# Patient Record
Sex: FEMALE | Race: BLACK OR AFRICAN AMERICAN | NOT HISPANIC OR LATINO
[De-identification: names, ages, dates, MRNs, and addresses within clinical notes are randomized per-mention and may not be internally consistent; named-entity substitution may affect disease eponyms.]

---

## 2017-10-06 ENCOUNTER — RECORD ABSTRACTING (OUTPATIENT)
Age: 67
End: 2017-10-06

## 2017-10-09 ENCOUNTER — RECORD ABSTRACTING (OUTPATIENT)
Age: 67
End: 2017-10-09

## 2017-10-09 DIAGNOSIS — Z82.49 FAMILY HISTORY OF ISCHEMIC HEART DISEASE AND OTHER DISEASES OF THE CIRCULATORY SYSTEM: ICD-10-CM

## 2017-10-09 DIAGNOSIS — Z83.3 FAMILY HISTORY OF DIABETES MELLITUS: ICD-10-CM

## 2017-10-12 ENCOUNTER — APPOINTMENT (OUTPATIENT)
Dept: CARDIOLOGY | Facility: CLINIC | Age: 67
End: 2017-10-12

## 2017-10-12 VITALS — SYSTOLIC BLOOD PRESSURE: 150 MMHG | DIASTOLIC BLOOD PRESSURE: 90 MMHG | HEART RATE: 78 BPM | RESPIRATION RATE: 18 BRPM

## 2017-10-12 VITALS — WEIGHT: 149 LBS | BODY MASS INDEX: 27.42 KG/M2 | HEIGHT: 62 IN

## 2017-12-08 ENCOUNTER — APPOINTMENT (OUTPATIENT)
Dept: CARDIOLOGY | Facility: CLINIC | Age: 67
End: 2017-12-08

## 2018-01-24 ENCOUNTER — APPOINTMENT (OUTPATIENT)
Dept: CARDIOLOGY | Facility: CLINIC | Age: 68
End: 2018-01-24

## 2018-01-24 VITALS — HEART RATE: 68 BPM | DIASTOLIC BLOOD PRESSURE: 80 MMHG | RESPIRATION RATE: 18 BRPM | SYSTOLIC BLOOD PRESSURE: 126 MMHG

## 2018-01-24 VITALS — BODY MASS INDEX: 27.6 KG/M2 | WEIGHT: 150 LBS | HEIGHT: 62 IN

## 2018-07-10 ENCOUNTER — APPOINTMENT (OUTPATIENT)
Dept: CARDIOLOGY | Facility: CLINIC | Age: 68
End: 2018-07-10

## 2018-07-10 VITALS — BODY MASS INDEX: 27.42 KG/M2 | WEIGHT: 149 LBS | HEIGHT: 62 IN

## 2018-07-10 VITALS — HEART RATE: 72 BPM | RESPIRATION RATE: 18 BRPM | DIASTOLIC BLOOD PRESSURE: 70 MMHG | SYSTOLIC BLOOD PRESSURE: 110 MMHG

## 2018-08-22 ENCOUNTER — APPOINTMENT (OUTPATIENT)
Dept: NEUROSURGERY | Facility: CLINIC | Age: 68
End: 2018-08-22
Payer: MEDICARE

## 2018-08-22 PROCEDURE — 99205 OFFICE O/P NEW HI 60 MIN: CPT

## 2018-09-04 ENCOUNTER — RX RENEWAL (OUTPATIENT)
Age: 68
End: 2018-09-04

## 2018-12-17 ENCOUNTER — APPOINTMENT (OUTPATIENT)
Dept: CARDIOLOGY | Facility: CLINIC | Age: 68
End: 2018-12-17

## 2019-01-09 ENCOUNTER — APPOINTMENT (OUTPATIENT)
Dept: CARDIOLOGY | Facility: CLINIC | Age: 69
End: 2019-01-09

## 2019-01-09 VITALS — BODY MASS INDEX: 27.6 KG/M2 | WEIGHT: 150 LBS | HEIGHT: 62 IN

## 2019-01-09 VITALS — SYSTOLIC BLOOD PRESSURE: 126 MMHG | DIASTOLIC BLOOD PRESSURE: 80 MMHG

## 2019-01-09 VITALS — RESPIRATION RATE: 18 BRPM | HEART RATE: 64 BPM

## 2019-01-09 DIAGNOSIS — R07.9 CHEST PAIN, UNSPECIFIED: ICD-10-CM

## 2019-01-09 NOTE — PHYSICAL EXAM
[General Appearance - Well Developed] : well developed [Normal Appearance] : normal appearance [Well Groomed] : well groomed [General Appearance - Well Nourished] : well nourished [No Deformities] : no deformities [General Appearance - In No Acute Distress] : no acute distress [Normal Conjunctiva] : the conjunctiva exhibited no abnormalities [Eyelids - No Xanthelasma] : the eyelids demonstrated no xanthelasmas [Normal Oral Mucosa] : normal oral mucosa [No Oral Pallor] : no oral pallor [No Oral Cyanosis] : no oral cyanosis [Normal Jugular Venous A Waves Present] : normal jugular venous A waves present [Normal Jugular Venous V Waves Present] : normal jugular venous V waves present [No Jugular Venous Grace A Waves] : no jugular venous grace A waves [Respiration, Rhythm And Depth] : normal respiratory rhythm and effort [Exaggerated Use Of Accessory Muscles For Inspiration] : no accessory muscle use [Auscultation Breath Sounds / Voice Sounds] : lungs were clear to auscultation bilaterally [Heart Rate And Rhythm] : heart rate and rhythm were normal [Heart Sounds] : normal S1 and S2 [Murmurs] : no murmurs present [Bowel Sounds] : normal bowel sounds [Abdomen Soft] : soft [Abdomen Tenderness] : non-tender [Abdomen Mass (___ Cm)] : no abdominal mass palpated [Abnormal Walk] : normal gait [Gait - Sufficient For Exercise Testing] : the gait was sufficient for exercise testing [Nail Clubbing] : no clubbing of the fingernails [Cyanosis, Localized] : no localized cyanosis [Petechial Hemorrhages (___cm)] : no petechial hemorrhages [Skin Color & Pigmentation] : normal skin color and pigmentation [] : no rash [No Venous Stasis] : no venous stasis [Skin Lesions] : no skin lesions [No Skin Ulcers] : no skin ulcer [No Xanthoma] : no  xanthoma was observed [Oriented To Time, Place, And Person] : oriented to person, place, and time

## 2019-02-12 ENCOUNTER — APPOINTMENT (OUTPATIENT)
Dept: CARDIOLOGY | Facility: CLINIC | Age: 69
End: 2019-02-12

## 2019-07-17 ENCOUNTER — APPOINTMENT (OUTPATIENT)
Dept: CARDIOLOGY | Facility: CLINIC | Age: 69
End: 2019-07-17
Payer: MEDICARE

## 2019-07-17 VITALS — DIASTOLIC BLOOD PRESSURE: 70 MMHG | RESPIRATION RATE: 18 BRPM | HEART RATE: 76 BPM | SYSTOLIC BLOOD PRESSURE: 126 MMHG

## 2019-07-17 PROCEDURE — 93000 ELECTROCARDIOGRAM COMPLETE: CPT

## 2019-07-17 PROCEDURE — 99214 OFFICE O/P EST MOD 30 MIN: CPT

## 2019-07-17 NOTE — PHYSICAL EXAM
[General Appearance - Well Developed] : well developed [Normal Appearance] : normal appearance [Well Groomed] : well groomed [General Appearance - Well Nourished] : well nourished [No Deformities] : no deformities [General Appearance - In No Acute Distress] : no acute distress [Normal Conjunctiva] : the conjunctiva exhibited no abnormalities [Eyelids - No Xanthelasma] : the eyelids demonstrated no xanthelasmas [Normal Oral Mucosa] : normal oral mucosa [No Oral Pallor] : no oral pallor [No Oral Cyanosis] : no oral cyanosis [Normal Jugular Venous A Waves Present] : normal jugular venous A waves present [Normal Jugular Venous V Waves Present] : normal jugular venous V waves present [No Jugular Venous Grace A Waves] : no jugular venous grace A waves [Heart Rate And Rhythm] : heart rate and rhythm were normal [Heart Sounds] : normal S1 and S2 [Murmurs] : no murmurs present [Respiration, Rhythm And Depth] : normal respiratory rhythm and effort [Exaggerated Use Of Accessory Muscles For Inspiration] : no accessory muscle use [Auscultation Breath Sounds / Voice Sounds] : lungs were clear to auscultation bilaterally [Bowel Sounds] : normal bowel sounds [Abdomen Soft] : soft [Abdomen Tenderness] : non-tender [Abdomen Mass (___ Cm)] : no abdominal mass palpated [Abnormal Walk] : normal gait [Gait - Sufficient For Exercise Testing] : the gait was sufficient for exercise testing [Nail Clubbing] : no clubbing of the fingernails [Cyanosis, Localized] : no localized cyanosis [Petechial Hemorrhages (___cm)] : no petechial hemorrhages [Skin Color & Pigmentation] : normal skin color and pigmentation [] : no rash [No Venous Stasis] : no venous stasis [Skin Lesions] : no skin lesions [No Skin Ulcers] : no skin ulcer [No Xanthoma] : no  xanthoma was observed [Oriented To Time, Place, And Person] : oriented to person, place, and time

## 2020-01-15 ENCOUNTER — APPOINTMENT (OUTPATIENT)
Dept: CARDIOLOGY | Facility: CLINIC | Age: 70
End: 2020-01-15
Payer: MEDICARE

## 2020-01-15 VITALS — HEART RATE: 84 BPM | DIASTOLIC BLOOD PRESSURE: 80 MMHG | SYSTOLIC BLOOD PRESSURE: 130 MMHG | RESPIRATION RATE: 18 BRPM

## 2020-01-15 VITALS — BODY MASS INDEX: 27.6 KG/M2 | WEIGHT: 150 LBS | HEIGHT: 62 IN

## 2020-01-15 PROCEDURE — 93000 ELECTROCARDIOGRAM COMPLETE: CPT

## 2020-01-15 PROCEDURE — 99214 OFFICE O/P EST MOD 30 MIN: CPT

## 2020-01-15 NOTE — PHYSICAL EXAM
[General Appearance - Well Developed] : well developed [Normal Appearance] : normal appearance [Well Groomed] : well groomed [No Deformities] : no deformities [General Appearance - Well Nourished] : well nourished [General Appearance - In No Acute Distress] : no acute distress [Normal Oral Mucosa] : normal oral mucosa [Normal Conjunctiva] : the conjunctiva exhibited no abnormalities [Eyelids - No Xanthelasma] : the eyelids demonstrated no xanthelasmas [No Oral Pallor] : no oral pallor [Normal Jugular Venous A Waves Present] : normal jugular venous A waves present [No Oral Cyanosis] : no oral cyanosis [Normal Jugular Venous V Waves Present] : normal jugular venous V waves present [No Jugular Venous Grace A Waves] : no jugular venous grace A waves [Heart Rate And Rhythm] : heart rate and rhythm were normal [Heart Sounds] : normal S1 and S2 [Murmurs] : no murmurs present [Respiration, Rhythm And Depth] : normal respiratory rhythm and effort [Auscultation Breath Sounds / Voice Sounds] : lungs were clear to auscultation bilaterally [Exaggerated Use Of Accessory Muscles For Inspiration] : no accessory muscle use [Bowel Sounds] : normal bowel sounds [Abdomen Soft] : soft [Abdomen Tenderness] : non-tender [Abdomen Mass (___ Cm)] : no abdominal mass palpated [Gait - Sufficient For Exercise Testing] : the gait was sufficient for exercise testing [Abnormal Walk] : normal gait [Cyanosis, Localized] : no localized cyanosis [Nail Clubbing] : no clubbing of the fingernails [Petechial Hemorrhages (___cm)] : no petechial hemorrhages [] : no rash [Skin Color & Pigmentation] : normal skin color and pigmentation [No Venous Stasis] : no venous stasis [Skin Lesions] : no skin lesions [No Skin Ulcers] : no skin ulcer [No Xanthoma] : no  xanthoma was observed [Oriented To Time, Place, And Person] : oriented to person, place, and time

## 2020-04-29 ENCOUNTER — OUTPATIENT (OUTPATIENT)
Dept: OUTPATIENT SERVICES | Facility: HOSPITAL | Age: 70
LOS: 1 days | Discharge: HOME | End: 2020-04-29

## 2020-07-17 ENCOUNTER — APPOINTMENT (OUTPATIENT)
Dept: CARDIOLOGY | Facility: CLINIC | Age: 70
End: 2020-07-17

## 2020-08-12 ENCOUNTER — APPOINTMENT (OUTPATIENT)
Dept: CARDIOLOGY | Facility: CLINIC | Age: 70
End: 2020-08-12
Payer: MEDICARE

## 2020-08-12 VITALS — SYSTOLIC BLOOD PRESSURE: 120 MMHG | RESPIRATION RATE: 18 BRPM | HEART RATE: 76 BPM | DIASTOLIC BLOOD PRESSURE: 80 MMHG

## 2020-08-12 VITALS — BODY MASS INDEX: 26.31 KG/M2 | TEMPERATURE: 96.1 F | HEIGHT: 62 IN | WEIGHT: 143 LBS

## 2020-08-12 PROCEDURE — 93000 ELECTROCARDIOGRAM COMPLETE: CPT

## 2020-08-12 PROCEDURE — 99214 OFFICE O/P EST MOD 30 MIN: CPT

## 2020-08-12 NOTE — PHYSICAL EXAM
[General Appearance - Well Developed] : well developed [Normal Appearance] : normal appearance [Well Groomed] : well groomed [General Appearance - Well Nourished] : well nourished [General Appearance - In No Acute Distress] : no acute distress [No Deformities] : no deformities [Normal Conjunctiva] : the conjunctiva exhibited no abnormalities [Eyelids - No Xanthelasma] : the eyelids demonstrated no xanthelasmas [Normal Oral Mucosa] : normal oral mucosa [No Oral Pallor] : no oral pallor [No Oral Cyanosis] : no oral cyanosis [Normal Jugular Venous V Waves Present] : normal jugular venous V waves present [Normal Jugular Venous A Waves Present] : normal jugular venous A waves present [Heart Rate And Rhythm] : heart rate and rhythm were normal [No Jugular Venous Grace A Waves] : no jugular venous grace A waves [Murmurs] : no murmurs present [Heart Sounds] : normal S1 and S2 [Respiration, Rhythm And Depth] : normal respiratory rhythm and effort [Exaggerated Use Of Accessory Muscles For Inspiration] : no accessory muscle use [Auscultation Breath Sounds / Voice Sounds] : lungs were clear to auscultation bilaterally [Bowel Sounds] : normal bowel sounds [Abdomen Soft] : soft [Abdomen Tenderness] : non-tender [Abdomen Mass (___ Cm)] : no abdominal mass palpated [Abnormal Walk] : normal gait [Gait - Sufficient For Exercise Testing] : the gait was sufficient for exercise testing [Nail Clubbing] : no clubbing of the fingernails [Cyanosis, Localized] : no localized cyanosis [Petechial Hemorrhages (___cm)] : no petechial hemorrhages [Skin Color & Pigmentation] : normal skin color and pigmentation [] : no rash [No Venous Stasis] : no venous stasis [Skin Lesions] : no skin lesions [No Skin Ulcers] : no skin ulcer [No Xanthoma] : no  xanthoma was observed [Oriented To Time, Place, And Person] : oriented to person, place, and time

## 2020-08-21 ENCOUNTER — APPOINTMENT (OUTPATIENT)
Dept: OTOLARYNGOLOGY | Facility: CLINIC | Age: 70
End: 2020-08-21
Payer: MEDICARE

## 2020-08-21 VITALS — BODY MASS INDEX: 26.5 KG/M2 | WEIGHT: 144 LBS | HEIGHT: 62 IN

## 2020-08-21 DIAGNOSIS — R13.10 DYSPHAGIA, UNSPECIFIED: ICD-10-CM

## 2020-08-21 PROCEDURE — 99204 OFFICE O/P NEW MOD 45 MIN: CPT

## 2020-08-21 NOTE — ASSESSMENT
[FreeTextEntry1] : Patient could not tolerate endoscopic exam today.\par She will go for a CT scan instead and a swallow study. \par \par i explained to the patient that we are sending her for the tests since she was unable to get the laryngoscopy today. \par She was thankful at the end of the consultation and will follow up after results. \par \par All questions were answered.

## 2020-08-21 NOTE — HISTORY OF PRESENT ILLNESS
[de-identified] : Patient presents today with c/o dysphagia. Patient admits it has been going on for a few months but recently worsened over the past month. it started all of a sudden, no recent infections. Patient states dysphagia with solid and warm foods. No issues with swallowing liquids. Occasional choking. \par Occasional reflux and PND.\par No dysphonia. No pain. \par \par Never smoked cigarettes.

## 2020-11-06 ENCOUNTER — APPOINTMENT (OUTPATIENT)
Dept: CARDIOLOGY | Facility: CLINIC | Age: 70
End: 2020-11-06
Payer: MEDICARE

## 2020-11-06 PROCEDURE — 99072 ADDL SUPL MATRL&STAF TM PHE: CPT

## 2020-11-06 PROCEDURE — 93306 TTE W/DOPPLER COMPLETE: CPT

## 2021-02-22 ENCOUNTER — APPOINTMENT (OUTPATIENT)
Dept: CARDIOLOGY | Facility: CLINIC | Age: 71
End: 2021-02-22
Payer: MEDICARE

## 2021-02-22 VITALS — SYSTOLIC BLOOD PRESSURE: 120 MMHG | HEART RATE: 72 BPM | RESPIRATION RATE: 18 BRPM | DIASTOLIC BLOOD PRESSURE: 70 MMHG

## 2021-02-22 VITALS — TEMPERATURE: 97.2 F | BODY MASS INDEX: 26.87 KG/M2 | WEIGHT: 146 LBS | HEIGHT: 62 IN

## 2021-02-22 PROCEDURE — 99214 OFFICE O/P EST MOD 30 MIN: CPT

## 2021-02-22 PROCEDURE — 93000 ELECTROCARDIOGRAM COMPLETE: CPT

## 2021-02-22 PROCEDURE — 99072 ADDL SUPL MATRL&STAF TM PHE: CPT

## 2021-03-16 LAB
ALBUMIN SERPL ELPH-MCNC: 4.1 G/DL
ALP BLD-CCNC: 102 U/L
ALT SERPL-CCNC: 13 U/L
ANION GAP SERPL CALC-SCNC: 10 MMOL/L
AST SERPL-CCNC: 21 U/L
BILIRUB SERPL-MCNC: 0.3 MG/DL
BUN SERPL-MCNC: 12 MG/DL
CALCIUM SERPL-MCNC: 9.2 MG/DL
CHLORIDE SERPL-SCNC: 103 MMOL/L
CHOLEST SERPL-MCNC: 211 MG/DL
CO2 SERPL-SCNC: 24 MMOL/L
CREAT SERPL-MCNC: 0.9 MG/DL
ESTIMATED AVERAGE GLUCOSE: 186 MG/DL
GLUCOSE SERPL-MCNC: 160 MG/DL
HBA1C MFR BLD HPLC: 8.1 %
HDLC SERPL-MCNC: 74 MG/DL
LDLC SERPL CALC-MCNC: 137 MG/DL
NONHDLC SERPL-MCNC: 137 MG/DL
POTASSIUM SERPL-SCNC: 4.3 MMOL/L
PROT SERPL-MCNC: 9 G/DL
SODIUM SERPL-SCNC: 137 MMOL/L
TRIGL SERPL-MCNC: 80 MG/DL

## 2021-08-20 LAB
ALBUMIN SERPL ELPH-MCNC: 4.2 G/DL
ALP BLD-CCNC: 101 U/L
ALT SERPL-CCNC: 18 U/L
ANION GAP SERPL CALC-SCNC: 10 MMOL/L
AST SERPL-CCNC: 29 U/L
BASOPHILS # BLD AUTO: 0.08 K/UL
BASOPHILS NFR BLD AUTO: 1.3 %
BILIRUB SERPL-MCNC: 0.4 MG/DL
BUN SERPL-MCNC: 11 MG/DL
CALCIUM SERPL-MCNC: 9.5 MG/DL
CHLORIDE SERPL-SCNC: 101 MMOL/L
CHOLEST SERPL-MCNC: 202 MG/DL
CO2 SERPL-SCNC: 26 MMOL/L
CREAT SERPL-MCNC: 1 MG/DL
EOSINOPHIL # BLD AUTO: 0.2 K/UL
EOSINOPHIL NFR BLD AUTO: 3.2 %
ESTIMATED AVERAGE GLUCOSE: 148 MG/DL
GLUCOSE SERPL-MCNC: 101 MG/DL
HBA1C MFR BLD HPLC: 6.8 %
HCT VFR BLD CALC: 36.6 %
HDLC SERPL-MCNC: 74 MG/DL
HGB BLD-MCNC: 12 G/DL
IMM GRANULOCYTES NFR BLD AUTO: 0.3 %
LDLC SERPL CALC-MCNC: 121 MG/DL
LYMPHOCYTES # BLD AUTO: 1.91 K/UL
LYMPHOCYTES NFR BLD AUTO: 30.1 %
MAN DIFF?: NORMAL
MCHC RBC-ENTMCNC: 27.5 PG
MCHC RBC-ENTMCNC: 32.8 G/DL
MCV RBC AUTO: 83.9 FL
MONOCYTES # BLD AUTO: 0.49 K/UL
MONOCYTES NFR BLD AUTO: 7.7 %
NEUTROPHILS # BLD AUTO: 3.64 K/UL
NEUTROPHILS NFR BLD AUTO: 57.4 %
NONHDLC SERPL-MCNC: 128 MG/DL
PLATELET # BLD AUTO: 224 K/UL
POTASSIUM SERPL-SCNC: 4.6 MMOL/L
PROT SERPL-MCNC: 9.2 G/DL
RBC # BLD: 4.36 M/UL
RBC # FLD: 14 %
SODIUM SERPL-SCNC: 137 MMOL/L
TRIGL SERPL-MCNC: 79 MG/DL
WBC # FLD AUTO: 6.34 K/UL

## 2021-08-25 ENCOUNTER — APPOINTMENT (OUTPATIENT)
Dept: CARDIOLOGY | Facility: CLINIC | Age: 71
End: 2021-08-25
Payer: MEDICARE

## 2021-08-25 VITALS — HEIGHT: 62 IN | WEIGHT: 145 LBS | BODY MASS INDEX: 26.68 KG/M2

## 2021-08-25 VITALS — SYSTOLIC BLOOD PRESSURE: 130 MMHG | DIASTOLIC BLOOD PRESSURE: 80 MMHG | RESPIRATION RATE: 18 BRPM | HEART RATE: 60 BPM

## 2021-08-25 PROCEDURE — 93000 ELECTROCARDIOGRAM COMPLETE: CPT

## 2021-08-25 PROCEDURE — 99214 OFFICE O/P EST MOD 30 MIN: CPT

## 2021-08-25 NOTE — PHYSICAL EXAM
[General Appearance - Well Developed] : well developed [Normal Appearance] : normal appearance [Well Groomed] : well groomed [General Appearance - Well Nourished] : well nourished [No Deformities] : no deformities [General Appearance - In No Acute Distress] : no acute distress [Normal Conjunctiva] : the conjunctiva exhibited no abnormalities [Eyelids - No Xanthelasma] : the eyelids demonstrated no xanthelasmas [Normal Oral Mucosa] : normal oral mucosa [No Oral Pallor] : no oral pallor [No Oral Cyanosis] : no oral cyanosis [Normal Jugular Venous A Waves Present] : normal jugular venous A waves present [Normal Jugular Venous V Waves Present] : normal jugular venous V waves present [No Jugular Venous Grace A Waves] : no jugular venous grace A waves [Heart Rate And Rhythm] : heart rate and rhythm were normal [Heart Sounds] : normal S1 and S2 [Murmurs] : no murmurs present [Respiration, Rhythm And Depth] : normal respiratory rhythm and effort [Exaggerated Use Of Accessory Muscles For Inspiration] : no accessory muscle use [Auscultation Breath Sounds / Voice Sounds] : lungs were clear to auscultation bilaterally [Bowel Sounds] : normal bowel sounds [Abdomen Soft] : soft [Abdomen Tenderness] : non-tender [Abdomen Mass (___ Cm)] : no abdominal mass palpated [Abnormal Walk] : normal gait [Gait - Sufficient For Exercise Testing] : the gait was sufficient for exercise testing [Nail Clubbing] : no clubbing of the fingernails [Cyanosis, Localized] : no localized cyanosis [Petechial Hemorrhages (___cm)] : no petechial hemorrhages [] : no rash [Skin Color & Pigmentation] : normal skin color and pigmentation [No Venous Stasis] : no venous stasis [Skin Lesions] : no skin lesions [No Skin Ulcers] : no skin ulcer [No Xanthoma] : no  xanthoma was observed [Oriented To Time, Place, And Person] : oriented to person, place, and time

## 2021-09-15 ENCOUNTER — APPOINTMENT (OUTPATIENT)
Dept: VASCULAR SURGERY | Facility: CLINIC | Age: 71
End: 2021-09-15
Payer: MEDICARE

## 2021-09-15 VITALS
DIASTOLIC BLOOD PRESSURE: 81 MMHG | HEART RATE: 59 BPM | TEMPERATURE: 94.5 F | WEIGHT: 147 LBS | SYSTOLIC BLOOD PRESSURE: 180 MMHG | HEIGHT: 62 IN | BODY MASS INDEX: 27.05 KG/M2

## 2021-09-15 DIAGNOSIS — I70.213 ATHEROSCLEROSIS OF NATIVE ARTERIES OF EXTREMITIES WITH INTERMITTENT CLAUDICATION, BILATERAL LEGS: ICD-10-CM

## 2021-09-15 DIAGNOSIS — I73.9 PERIPHERAL VASCULAR DISEASE, UNSPECIFIED: ICD-10-CM

## 2021-09-15 PROCEDURE — 99204 OFFICE O/P NEW MOD 45 MIN: CPT

## 2021-09-15 PROCEDURE — 93925 LOWER EXTREMITY STUDY: CPT

## 2021-09-15 NOTE — ASSESSMENT
[FreeTextEntry1] : Patient is a 71-year-old female with diabetes who presents with lower extremity numbness. Patient has a palpable dorsalis pedis pulse on physical exam. 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 She denies specific claudication-type symptoms or rest pain. Patient has no evidence of tissue loss. From my standpoint I would like see the patient back in my office in 6 month time for a followup evaluation. Neurovascular surgery intervention at this time. I recommend she followup with neurology for her lower back evaluation.

## 2021-09-15 NOTE — PHYSICAL EXAM
[0] : left 0 [2+] : left 2+ [Ankle Swelling On The Right] : mild [] : of the right leg [Alert] : alert [Oriented to Person] : oriented to person [Oriented to Place] : oriented to place [Oriented to Time] : oriented to time [Calm] : calm [Ankle Swelling (On Exam)] : not present [Varicose Veins Of Lower Extremities] : not present [Abdomen Masses] : No abdominal masses [Abdomen Tenderness] : ~T ~M No abdominal tenderness [Abdominal Bruit] : no abdominal bruit  [Tender] : was nontender [Enlarged] : not enlarged [Purpura] : no purpura  [Petechiae] : no petechiae [Skin Ulcer] : no ulcer [Skin Induration] : no induration

## 2021-09-15 NOTE — HISTORY OF PRESENT ILLNESS
[FreeTextEntry1] : Patient is a 71-year-old female who presents complaining of numbness which is intermittent her feet. The patient states she wakes up from her sleep with numbness to her feet. The patient is diabetic. Chest complains of back pain which is intermittent. Patient states her ambulation has gotten slower and she fatigues  easier

## 2021-10-04 ENCOUNTER — APPOINTMENT (OUTPATIENT)
Dept: VASCULAR SURGERY | Facility: CLINIC | Age: 71
End: 2021-10-04

## 2021-10-13 ENCOUNTER — APPOINTMENT (OUTPATIENT)
Dept: OBGYN | Facility: CLINIC | Age: 71
End: 2021-10-13

## 2022-01-13 ENCOUNTER — APPOINTMENT (OUTPATIENT)
Dept: OBGYN | Facility: CLINIC | Age: 72
End: 2022-01-13
Payer: MEDICARE

## 2022-01-13 VITALS
HEART RATE: 79 BPM | BODY MASS INDEX: 26.31 KG/M2 | TEMPERATURE: 97.7 F | DIASTOLIC BLOOD PRESSURE: 80 MMHG | SYSTOLIC BLOOD PRESSURE: 132 MMHG | WEIGHT: 143 LBS | HEIGHT: 62 IN

## 2022-01-13 DIAGNOSIS — Z78.9 OTHER SPECIFIED HEALTH STATUS: ICD-10-CM

## 2022-01-13 LAB
BILIRUB UR QL STRIP: NORMAL
CLARITY UR: CLEAR
COLLECTION METHOD: NORMAL
GLUCOSE UR-MCNC: NORMAL
HCG UR QL: 0.2 EU/DL
HGB UR QL STRIP.AUTO: NORMAL
KETONES UR-MCNC: NORMAL
LEUKOCYTE ESTERASE UR QL STRIP: ABNORMAL
NITRITE UR QL STRIP: NORMAL
PH UR STRIP: 5
PROT UR STRIP-MCNC: ABNORMAL
SP GR UR STRIP: 1.02

## 2022-01-13 PROCEDURE — 99387 INIT PM E/M NEW PAT 65+ YRS: CPT

## 2022-01-13 PROCEDURE — 81003 URINALYSIS AUTO W/O SCOPE: CPT | Mod: QW

## 2022-01-13 PROCEDURE — G0101: CPT

## 2022-01-13 NOTE — REVIEW OF SYSTEMS
[Urgency] : urgency [Pelvic pain] : pelvic pain [Skin Rash] : skin rash [Negative] : Heme/Lymph [de-identified] : vuvlar discomfort ,

## 2022-01-13 NOTE — PHYSICAL EXAM
[Appropriately responsive] : appropriately responsive [Alert] : alert [No Acute Distress] : no acute distress [No Lymphadenopathy] : no lymphadenopathy [Regular Rate Rhythm] : regular rate rhythm [No Murmurs] : no murmurs [Clear to Auscultation B/L] : clear to auscultation bilaterally [Soft] : soft [Non-distended] : non-distended [No HSM] : No HSM [No Lesions] : no lesions [No Mass] : no mass [Oriented x3] : oriented x3 [FreeTextEntry6] : wnl [FreeTextEntry7] : mildly tender  [Examination Of The Breasts] : a normal appearance [Normal] : normal [No Discharge] : no discharge [No Masses] : no breast masses were palpable [FreeTextEntry1] : b/l labial b/l dermatitis , erythema

## 2022-01-13 NOTE — HISTORY OF PRESENT ILLNESS
[N] : Patient denies prior pregnancies [Patient reported mammogram was normal] : Patient reported mammogram was normal [Patient reported PAP Smear was normal] : Patient reported PAP Smear was normal [Hot Flashes] : hot flashes [Menarche Age: ____] : age at menarche was [unfilled] [Menopause Age: ____] : age at menopause was [unfilled] [FreeTextEntry1] : 72 yo P-0 LMP- menopause age 38   is here for initial visit , annual exam , denies vaginal bleeding , Patient , complain of pelvic pain both sides on and off for 2 years. dull, nothing makes it better or worse. She states that over past year It takes a while  for he to void , no pain with urination. She denies n/v/d constipation or bloating.  [TextBox_4] : GYNHX\par No history of fibroids, cysts, or STDs\par  [Mammogramdate] : 2019 [PapSmeardate] : 2014 [BoneDensityDate] : 2016 [ColonoscopyDate] : 2010 [PGHxTotal] : 0

## 2022-01-13 NOTE — DISCUSSION/SUMMARY
[FreeTextEntry1] : 72 yo p0 for annual exam . pelvic pain , vulvar erythema/eczema\par difficulty urinating \par mamogram\par dexa\par urogyn referal\par hydrocortisone 2.5 % bid for a week \par pelvic sono to schedule\par Gi f/up advised for colonoscopy

## 2022-02-01 ENCOUNTER — LABORATORY RESULT (OUTPATIENT)
Age: 72
End: 2022-02-01

## 2022-02-01 ENCOUNTER — APPOINTMENT (OUTPATIENT)
Dept: NEUROLOGY | Facility: CLINIC | Age: 72
End: 2022-02-01
Payer: MEDICARE

## 2022-02-01 VITALS
HEART RATE: 81 BPM | OXYGEN SATURATION: 97 % | HEIGHT: 63 IN | SYSTOLIC BLOOD PRESSURE: 158 MMHG | DIASTOLIC BLOOD PRESSURE: 75 MMHG | TEMPERATURE: 97 F | WEIGHT: 146 LBS | BODY MASS INDEX: 25.87 KG/M2

## 2022-02-01 DIAGNOSIS — R41.3 OTHER AMNESIA: ICD-10-CM

## 2022-02-01 PROCEDURE — 99204 OFFICE O/P NEW MOD 45 MIN: CPT

## 2022-02-01 RX ORDER — METFORMIN ER 750 MG 750 MG/1
750 TABLET ORAL DAILY
Qty: 60 | Refills: 5 | Status: ACTIVE | COMMUNITY

## 2022-02-01 RX ORDER — AMLODIPINE BESYLATE AND BENAZEPRIL HYDROCHLORIDE 10; 20 MG/1; MG/1
10-20 CAPSULE ORAL
Refills: 0 | Status: ACTIVE | COMMUNITY

## 2022-02-01 RX ORDER — GLYBURIDE 5 MG/1
5 TABLET ORAL TWICE DAILY
Qty: 180 | Refills: 3 | Status: ACTIVE | COMMUNITY

## 2022-02-02 NOTE — PHYSICAL EXAM
[FreeTextEntry1] : Recent and remote memory, general fund of knowledge, attention, concentration, and language function were intact.  Pupils are equal, round and reactive to light and accommodation.  Funduscopic exam did not show any papilledema.  Visual fields are intact to confrontation.  Extraocular movements are full.  There is no nystagmus.  The facial muscles are symmetric.  Facial sensation is intact to light touch, temperature, and pinprick.  Hearing is intact to finger rub bilaterally.  Tongue is midline.  Palate elevates symmetrically.  Neck is supple.  There is no meningismus.  Shoulder shrugs are symmetric.  Motor exam demonstrates 5/5 strength in the proximal and distal muscles of the upper and lower extremities.  Tone and bulk were normal.  There is no pronator drift.  Reflexes are 2+ bilaterally in the biceps, triceps, brachioradialis, patellae and ankles.  Toes are downgoing.  There is no clonus.  Coordination demonstrates normal finger-to-nose, heel-to-shin and rapid alternating movements.  Gait demonstrates normal tandem, heel, and toe walk.  LT, PP, vibration intact in UE's but decrease LT, PP below calves in LE's.   \par \par The patient is well-developed, well-nourished female in no acute distress.  Cardiac exam demonstrates a regular rate and rhythm.  No murmurs.  Carotids are 2+ bilaterally.  No bruits.  Abdomen is soft, nontender, and nondistended.  Bowel sounds are present.  Extremities showed no clubbing, cyanosis or edema. \par \par \par

## 2022-02-02 NOTE — REVIEW OF SYSTEMS
[Feeling Tired] : feeling tired [Recent Weight Loss (___ Lbs)] : recent [unfilled] ~Ulb weight loss [Memory Lapses or Loss] : memory loss [Hand Weakness] :  hand weakness [Poor Coordination] : poor coordination [Numbness] : numbness [Tingling] : tingling [Vertigo] : vertigo [Tension Headache] : tension-type headaches [Anxiety] : anxiety [Eye Pain] : eye pain [Arthralgias] : arthralgias [Joint Pain] : joint pain [Muscle Weakness] : muscle weakness [Fever] : no fever [Chills] : no chills [Feeling Poorly] : not feeling poorly [Recent Weight Gain (___ Lbs)] : no recent weight gain [Confused or Disoriented] : no confusion [Decr. Concentrating Ability] : no decrease in concentrating ability [Facial Weakness] : no facial weakness [Arm Weakness] : no arm weakness [Leg Weakness] : no leg weakness [Seizures] : no convulsions [Dizziness] : no dizziness [Fainting] : no fainting [Lightheadedness] : no lightheadedness [Cluster Headache] : no cluster headache [Migraine Headache] : no migraine headache [Difficulty Walking] : no difficulty walking [Inability to Walk] : able to walk [Frequent Falls] : not falling [Suicidal] : not suicidal [Sleep Disturbances] : no sleep disturbances [Depression] : no depression [Eyesight Problems] : no eyesight problems [Earache] : no earache [Loss Of Hearing] : no hearing loss [Heart Rate Is Slow] : the heart rate was not slow [Heart Rate Is Fast] : the heart rate was not fast [Chest Pain] : no chest pain [Palpitations] : no palpitations [Shortness Of Breath] : no shortness of breath [Wheezing] : no wheezing [Cough] : no cough [Abdominal Pain] : no abdominal pain [Vomiting] : no vomiting [Constipation] : no constipation [Diarrhea] : no diarrhea [Dysuria] : no dysuria [Incontinence] : no incontinence [Joint Swelling] : no joint swelling [Joint Stiffness] : no joint stiffness [Skin Lesions] : no skin lesions [Skin Wound] : no skin wound [Hot Flashes] : no hot flashes [Easy Bleeding] : no tendency for easy bleeding [Easy Bruising] : no tendency for easy bruising [de-identified] : tingling is usually in hands or legs.  recent vertigo last weak when she woke up [FreeTextEntry9] : toes, legs [de-identified] : in hands and arms

## 2022-02-02 NOTE — ASSESSMENT
[FreeTextEntry1] : 1.  Suspect bilateral CTS.\par 2.  Numbness suspected neuropathy.  \par 3.  Given asymmetrical sensory complaints and memory loss need to exclude stroke.\par \par Plan:\par 1.  EMG/NCS bilateral UE's and LLE.\par 2.  Wear bilateral wrist splints at night.\par 3.  Labs:  B12/folate, TSH, SPEP/IF, HIV, Lyme.\par 4.  MRI brain w/o contrast.\par 5.  Aspirin 81 mg/day until MRI resulted.\par 6.  Continue to work on blood sugar control.

## 2022-02-02 NOTE — HISTORY OF PRESENT ILLNESS
[FreeTextEntry1] : Pt is 71 yof diabetic c/o sharp pains on her left side leg (squeezing in left arm). Felt pain in her face.  These symptoms have been going on for a while.  States sx's started right after she got the Vaccine ([employer]), first one April 2021, second dose in May 2021.  Can't recall whether started after first or second dose, but she thinks it was after the first dose.  States it a few weeks after the first dose.  Squeezing of muscles of left arm woke her up and made it hard to sleep.  Symptoms are not all day, starts in middle of night and wears off.  States she has chronic neck pain.  Denies neck or spine surgeries.  States problems with her balance all the time - for a while (couple of years), but more now than before (before was occasional, now every day).  States she has chronic shoulder pain  bilaterally.  (Currently doesn't feel it, but does hurt on a daily basis, has back pain (mid to lower) mostly when stressed.\par \par Never EMG/NCS.  States hasn't been going to doctor since experiencing these things.  \par \par Stats A1c was 6.9

## 2022-02-10 ENCOUNTER — APPOINTMENT (OUTPATIENT)
Dept: CARDIOLOGY | Facility: CLINIC | Age: 72
End: 2022-02-10
Payer: MEDICARE

## 2022-02-10 PROCEDURE — 93306 TTE W/DOPPLER COMPLETE: CPT

## 2022-02-11 LAB
ALBUMIN SERPL ELPH-MCNC: 4.3 G/DL
ALP BLD-CCNC: 102 U/L
ALT SERPL-CCNC: 11 U/L
ANION GAP SERPL CALC-SCNC: 11 MMOL/L
AST SERPL-CCNC: 18 U/L
BILIRUB SERPL-MCNC: 0.4 MG/DL
BUN SERPL-MCNC: 15 MG/DL
CALCIUM SERPL-MCNC: 9.5 MG/DL
CHLORIDE SERPL-SCNC: 104 MMOL/L
CHOLEST SERPL-MCNC: 199 MG/DL
CO2 SERPL-SCNC: 25 MMOL/L
CREAT SERPL-MCNC: 1 MG/DL
ESTIMATED AVERAGE GLUCOSE: 154 MG/DL
GLUCOSE SERPL-MCNC: 115 MG/DL
HBA1C MFR BLD HPLC: 7 %
HDLC SERPL-MCNC: 77 MG/DL
LDLC SERPL CALC-MCNC: 117 MG/DL
NONHDLC SERPL-MCNC: 122 MG/DL
POTASSIUM SERPL-SCNC: 4.5 MMOL/L
PROT SERPL-MCNC: 9.5 G/DL
SODIUM SERPL-SCNC: 140 MMOL/L
TRIGL SERPL-MCNC: 74 MG/DL

## 2022-02-28 ENCOUNTER — RX RENEWAL (OUTPATIENT)
Age: 72
End: 2022-02-28

## 2022-03-23 ENCOUNTER — APPOINTMENT (OUTPATIENT)
Dept: CARDIOLOGY | Facility: CLINIC | Age: 72
End: 2022-03-23
Payer: MEDICARE

## 2022-03-23 VITALS — RESPIRATION RATE: 18 BRPM | DIASTOLIC BLOOD PRESSURE: 80 MMHG | SYSTOLIC BLOOD PRESSURE: 130 MMHG | HEART RATE: 64 BPM

## 2022-03-23 VITALS — HEIGHT: 63 IN | BODY MASS INDEX: 26.22 KG/M2 | WEIGHT: 148 LBS

## 2022-03-23 PROCEDURE — 99214 OFFICE O/P EST MOD 30 MIN: CPT

## 2022-03-23 PROCEDURE — 93000 ELECTROCARDIOGRAM COMPLETE: CPT

## 2022-05-31 LAB
ALBUMIN MFR SERPL ELPH: 45.4 %
ALBUMIN SERPL-MCNC: 4.2 G/DL
ALBUMIN/GLOB SERPL: 0.8 RATIO
ALPHA1 GLOB MFR SERPL ELPH: 3.3 %
ALPHA1 GLOB SERPL ELPH-MCNC: 0.3 G/DL
ALPHA2 GLOB MFR SERPL ELPH: 7.5 %
ALPHA2 GLOB SERPL ELPH-MCNC: 0.7 G/DL
B-GLOBULIN MFR SERPL ELPH: 11.3 %
B-GLOBULIN SERPL ELPH-MCNC: 1 G/DL
DEPRECATED KAPPA LC FREE/LAMBDA SER: 2.13 RATIO
FOLATE SERPL-MCNC: >20 NG/ML
GAMMA GLOB FLD ELPH-MCNC: 3 G/DL
GAMMA GLOB MFR SERPL ELPH: 32.5 %
HIV1+2 AB SPEC QL IA.RAPID: NONREACTIVE
IGA SER QL IEP: 443 MG/DL
IGG SER QL IEP: 3294 MG/DL
IGM SER QL IEP: 75 MG/DL
INTERPRETATION SERPL IEP-IMP: NORMAL
KAPPA LC CSF-MCNC: 3.86 MG/DL
KAPPA LC SERPL-MCNC: 8.24 MG/DL
M PROTEIN SPEC IFE-MCNC: NORMAL
PROT SERPL-MCNC: 9.2 G/DL
PROT SERPL-MCNC: 9.2 G/DL
T PALLIDUM AB SER QL IA: NEGATIVE
TSH SERPL-ACNC: 3.94 UIU/ML
VIT B12 SERPL-MCNC: >2000 PG/ML

## 2022-06-09 ENCOUNTER — LABORATORY RESULT (OUTPATIENT)
Age: 72
End: 2022-06-09

## 2022-06-10 ENCOUNTER — APPOINTMENT (OUTPATIENT)
Dept: UROGYNECOLOGY | Facility: CLINIC | Age: 72
End: 2022-06-10
Payer: MEDICARE

## 2022-06-10 VITALS
BODY MASS INDEX: 26.68 KG/M2 | SYSTOLIC BLOOD PRESSURE: 131 MMHG | WEIGHT: 145 LBS | DIASTOLIC BLOOD PRESSURE: 72 MMHG | HEIGHT: 62 IN | HEART RATE: 61 BPM

## 2022-06-10 DIAGNOSIS — Z72.89 OTHER PROBLEMS RELATED TO LIFESTYLE: ICD-10-CM

## 2022-06-10 DIAGNOSIS — L29.2 PRURITUS VULVAE: ICD-10-CM

## 2022-06-10 DIAGNOSIS — Z01.419 ENCOUNTER FOR GYNECOLOGICAL EXAMINATION (GENERAL) (ROUTINE) W/OUT ABNORMAL FINDINGS: ICD-10-CM

## 2022-06-10 DIAGNOSIS — Z86.39 PERSONAL HISTORY OF OTHER ENDOCRINE, NUTRITIONAL AND METABOLIC DISEASE: ICD-10-CM

## 2022-06-10 DIAGNOSIS — R10.2 PELVIC AND PERINEAL PAIN: ICD-10-CM

## 2022-06-10 DIAGNOSIS — N39.41 URGE INCONTINENCE: ICD-10-CM

## 2022-06-10 DIAGNOSIS — Z78.9 OTHER SPECIFIED HEALTH STATUS: ICD-10-CM

## 2022-06-10 DIAGNOSIS — Z87.898 PERSONAL HISTORY OF OTHER SPECIFIED CONDITIONS: ICD-10-CM

## 2022-06-10 PROCEDURE — 51701 INSERT BLADDER CATHETER: CPT

## 2022-06-10 PROCEDURE — 99205 OFFICE O/P NEW HI 60 MIN: CPT | Mod: 25

## 2022-06-11 LAB
APPEARANCE: CLEAR
BILIRUBIN URINE: NEGATIVE
BLOOD URINE: NEGATIVE
COLOR: NORMAL
GLUCOSE QUALITATIVE U: NORMAL
KETONES URINE: NEGATIVE
LEUKOCYTE ESTERASE URINE: NEGATIVE
NITRITE URINE: NEGATIVE
PH URINE: 5.5
PROTEIN URINE: ABNORMAL
SPECIFIC GRAVITY URINE: 1.02
UROBILINOGEN URINE: NORMAL

## 2022-06-11 NOTE — COUNSELING
[FreeTextEntry1] : \par We will notify you of the urine results if they are abnormal\par \par Please increase your water intake to at least 4-5 cups of 8 ounces of plain water\par \par For 4-5 days, cut one item from the list out of your diet.\par \par After 4-5 days, it it makes a difference, you have to decide if it is worth keeping it out of your diet to help with the urinary issues.\par \par If it does not make a difference, you should add it back into your diet and remove another item for another 4-5 days.\par \par Please work on your diabetes control\par \par Please try to urinate with the leg rest to see if that helps you urinate\par \par Referral to pelvic floor physical therapy\par \par [facility]\par [address] [place] [address]\par Phone: [phone]\par \par Please call the office if you feel like you do not have enough improvement of your symptoms towards the end of your physical therapy treatment so that we can arrange the next step of management.\par \par Followup as needed

## 2022-06-11 NOTE — PHYSICAL EXAM
[Chaperone Present] : A chaperone was present in the examining room during all aspects of the physical examination [FreeTextEntry1] : Void: 75 cc (no crede)\par PVR: 55 cc\par Urethra was prepped in sterile fashion and then a sterile catheter (14F) was used by me to drain the bladder. The patient tolerated the procedure well.\par \par normal perineal sensation\par normal perineal reflexes\par negative cough stress test\par positive atrophy\par positive urethral caruncle\par no prolapse\par positive urethral hypermobility\par bilateral levator ani spasm, positive tenderness\par no urethral tenderness\par no bladder tenderness\par no cervical tenderness\par 1/5 Kegel\par

## 2022-06-11 NOTE — DISCUSSION/SUMMARY
[FreeTextEntry1] : \par Urge incontinence-\par The pathophysiology of the above condition was discussed with the patient. The patient was given information and education on pelvic floor muscle exercises/rehabilitation, avoidance of dietary bladder irritants, and other strategies to improve bladder control, such as scheduled voiding. She was counseled regarding further management strategies for overactive bladder and urge incontinence including pelvic floor physical therapy, medications or surgical management. The patient voiced understanding and agrees with diet changes and a referral to PT. We will follow up on her urine tests.\par \par

## 2022-06-11 NOTE — HISTORY OF PRESENT ILLNESS
[FreeTextEntry1] : \par Pt with pelvic floor dysfunction here for urogynecologic evaluation. She describes: \par \par Chief PFD:  urinary hesitancy\par \par Pelvic organ prolapse: no bulge, denies splinting, intermittent crede for urination (only a drop more comes out)\par Stress urinary incontinence: denies\par Overactive bladder syndrome: +urgency, frequency, no eneuresis, urge incontinence a couple of times per week, for a year, no prior treatment, no glaucoma, hx of DM (AIC 7.3)\par Voiding dysfunction:  yes Incomplete bladder emptying, yes hesitancy \par Lower urinary tract/vaginal symptoms: no recurrent UTIs per year, no hematuria, no dysuria, no bladder pain \par Fecal incontinence: denies\par Defecatory dysfunction: sausage\par Sexual dysfunction: not active\par Pelvic pain: suprapubic, intermittent, non radiating, for the last year, 4/10, worse when she needs to urinate, improves when she presses on that area, dull, does not have it now\par Vaginal dryness: denies\par \par Her pelvic floor symptoms are significantly bothersome and negatively impacting her quality of life. \par \par

## 2022-06-14 LAB — BACTERIA UR CULT: NORMAL

## 2022-08-03 ENCOUNTER — APPOINTMENT (OUTPATIENT)
Dept: NEUROLOGY | Facility: CLINIC | Age: 72
End: 2022-08-03

## 2022-08-03 DIAGNOSIS — H91.92 UNSPECIFIED HEARING LOSS, LEFT EAR: ICD-10-CM

## 2022-08-03 PROCEDURE — 95886 MUSC TEST DONE W/N TEST COMP: CPT

## 2022-08-03 PROCEDURE — 95913 NRV CNDJ TEST 13/> STUDIES: CPT

## 2022-12-21 ENCOUNTER — APPOINTMENT (OUTPATIENT)
Dept: NEUROLOGY | Facility: CLINIC | Age: 72
End: 2022-12-21

## 2022-12-21 VITALS
HEIGHT: 62 IN | SYSTOLIC BLOOD PRESSURE: 137 MMHG | DIASTOLIC BLOOD PRESSURE: 85 MMHG | BODY MASS INDEX: 24.48 KG/M2 | HEART RATE: 88 BPM | WEIGHT: 133 LBS

## 2022-12-21 PROCEDURE — 99204 OFFICE O/P NEW MOD 45 MIN: CPT

## 2022-12-21 NOTE — ASSESSMENT
[FreeTextEntry1] : Unsteady gait- as part of post concussive syndrome as well as possibly secondary to peripheral neuropathy.  Would like to obtain all the workup that has been done at the hospital for review and I would like to send her for EMG nerve conduction study of the lower extremities for further evaluation.    I recommend a trial of physical therapy for fall prevention and gait training as well as acupuncture if available\par \par Cervical radiculopathy- I would like to send her for MRI of the cervical spine without gadolinium  as well as EMG nerve conduction study of the upper extremities for further evaluation.\par \par  syncope-  without the results from the hospital,  it is unclear the exact cause of the syncope and whether it is related to either carotid or vertebral artery stenosis as patient reported.  She should continue to follow-up with medical doctor for this.

## 2022-12-21 NOTE — HISTORY OF PRESENT ILLNESS
[FreeTextEntry1] : It's a pleasure to see Ms. GEO CARMICHAEL In the office today. She is a 72 year -  old woman  who presents to the office today for the evaluation of  unsteady gait after head injury secondary to an episode of syncope 2 months ago.  She reports that she passed out at a bus stop and hit her head.  She was taken to the hospital and have scan of the brain which did not show any bleeding.  Supposedly there was this so stenosis that was attributed to the cause of the syncope, but the result of the workup isn't available to us at this time.  She did have a 2nd syncopal episode 2 weeks later and she had talked to her medical doctor who is also in the process of getting information from [facility]  where she had a workup.  There was no mentioning of any intervention for the stenosis is at this time.  She reports that in addition to feeling unsteady, she has been having numbness in her feet which never had before although she has been diabetic for long time.  In addition she also reports feeling weak in her hands since the accident   And she has been having neck pain with pain going down her arms.\par \par   She walks with a cane\par

## 2022-12-21 NOTE — PHYSICAL EXAM
[Person] : oriented to person [Place] : oriented to place [Time] : oriented to time [Concentration Intact] : normal concentrating ability [Visual Intact] : visual attention was ~T not ~L decreased [Naming Objects] : no difficulty naming common objects [Repeating Phrases] : no difficulty repeating a phrase [Writing A Sentence] : no difficulty writing a sentence [Fluency] : fluency intact [Comprehension] : comprehension intact [Reading] : reading intact [Past History] : adequate knowledge of personal past history [Cranial Nerves Optic (II)] : visual acuity intact bilaterally,  visual fields full to confrontation, pupils equal round and reactive to light [Cranial Nerves Oculomotor (III)] : extraocular motion intact [Cranial Nerves Trigeminal (V)] : facial sensation intact symmetrically [Cranial Nerves Facial (VII)] : face symmetrical [Cranial Nerves Vestibulocochlear (VIII)] : hearing was intact bilaterally [Cranial Nerves Glossopharyngeal (IX)] : tongue and palate midline [Cranial Nerves Accessory (XI - Cranial And Spinal)] : head turning and shoulder shrug symmetric [Cranial Nerves Hypoglossal (XII)] : there was no tongue deviation with protrusion [No Muscle Atrophy] : normal bulk in all four extremities [Sensation Tactile Decrease] : light touch was intact [0] : Ankle jerk left 0 [Past-pointing] : there was no past-pointing [Tremor] : no tremor present [Plantar Reflex Right Only] : normal on the right [Plantar Reflex Left Only] : normal on the left [FreeTextEntry6] :  bilateral  weakness 4-/ 5 [FreeTextEntry8] :  positive Romberg, trouble with heel /toe /tandem

## 2023-01-13 ENCOUNTER — APPOINTMENT (OUTPATIENT)
Dept: NEUROLOGY | Facility: CLINIC | Age: 73
End: 2023-01-13
Payer: MEDICARE

## 2023-01-13 PROCEDURE — 95886 MUSC TEST DONE W/N TEST COMP: CPT

## 2023-01-13 PROCEDURE — 95913 NRV CNDJ TEST 13/> STUDIES: CPT

## 2023-01-20 ENCOUNTER — APPOINTMENT (OUTPATIENT)
Dept: CARDIOLOGY | Facility: CLINIC | Age: 73
End: 2023-01-20
Payer: MEDICARE

## 2023-01-20 ENCOUNTER — APPOINTMENT (OUTPATIENT)
Dept: CARDIOLOGY | Facility: CLINIC | Age: 73
End: 2023-01-20

## 2023-01-20 VITALS — RESPIRATION RATE: 18 BRPM | HEART RATE: 64 BPM

## 2023-01-20 VITALS — WEIGHT: 141 LBS | BODY MASS INDEX: 25.95 KG/M2 | HEIGHT: 62 IN

## 2023-01-20 VITALS — DIASTOLIC BLOOD PRESSURE: 80 MMHG | SYSTOLIC BLOOD PRESSURE: 110 MMHG

## 2023-01-20 DIAGNOSIS — R00.2 PALPITATIONS: ICD-10-CM

## 2023-01-20 PROCEDURE — 99214 OFFICE O/P EST MOD 30 MIN: CPT | Mod: 25

## 2023-01-20 PROCEDURE — 93000 ELECTROCARDIOGRAM COMPLETE: CPT

## 2023-01-20 NOTE — PHYSICAL EXAM
[General Appearance - Well Developed] : well developed [Normal Appearance] : normal appearance [Well Groomed] : well groomed [General Appearance - Well Nourished] : well nourished [No Deformities] : no deformities [General Appearance - In No Acute Distress] : no acute distress [Normal Conjunctiva] : the conjunctiva exhibited no abnormalities [Eyelids - No Xanthelasma] : the eyelids demonstrated no xanthelasmas [Normal Oral Mucosa] : normal oral mucosa [No Oral Pallor] : no oral pallor [No Oral Cyanosis] : no oral cyanosis [Normal Jugular Venous A Waves Present] : normal jugular venous A waves present [Normal Jugular Venous V Waves Present] : normal jugular venous V waves present [No Jugular Venous Grace A Waves] : no jugular venous grace A waves [Heart Rate And Rhythm] : heart rate and rhythm were normal [Heart Sounds] : normal S1 and S2 [Murmurs] : no murmurs present [Respiration, Rhythm And Depth] : normal respiratory rhythm and effort [Exaggerated Use Of Accessory Muscles For Inspiration] : no accessory muscle use [Auscultation Breath Sounds / Voice Sounds] : lungs were clear to auscultation bilaterally [Bowel Sounds] : normal bowel sounds [Abdomen Soft] : soft [Abdomen Tenderness] : non-tender [Abdomen Mass (___ Cm)] : no abdominal mass palpated [Abnormal Walk] : normal gait [Gait - Sufficient For Exercise Testing] : the gait was sufficient for exercise testing [Cyanosis, Localized] : no localized cyanosis [Nail Clubbing] : no clubbing of the fingernails [Petechial Hemorrhages (___cm)] : no petechial hemorrhages [Skin Color & Pigmentation] : normal skin color and pigmentation [] : no rash [No Venous Stasis] : no venous stasis [Skin Lesions] : no skin lesions [No Skin Ulcers] : no skin ulcer [No Xanthoma] : no  xanthoma was observed [Oriented To Time, Place, And Person] : oriented to person, place, and time

## 2023-01-23 ENCOUNTER — APPOINTMENT (OUTPATIENT)
Dept: NEUROLOGY | Facility: CLINIC | Age: 73
End: 2023-01-23

## 2023-02-08 ENCOUNTER — APPOINTMENT (OUTPATIENT)
Dept: NEUROLOGY | Facility: CLINIC | Age: 73
End: 2023-02-08
Payer: MEDICARE

## 2023-02-08 VITALS
WEIGHT: 140 LBS | DIASTOLIC BLOOD PRESSURE: 75 MMHG | HEIGHT: 62 IN | SYSTOLIC BLOOD PRESSURE: 143 MMHG | HEART RATE: 59 BPM | BODY MASS INDEX: 25.76 KG/M2

## 2023-02-08 PROCEDURE — 99214 OFFICE O/P EST MOD 30 MIN: CPT

## 2023-02-08 NOTE — HISTORY OF PRESENT ILLNESS
[FreeTextEntry1] : Ms. GEO GRECO returns to the office for follow-up and her prior history and physical have been reviewed and she reports no change since last visit.  she has been seeing us for Evaluation of syncope that happened 2 months ago.  We did not have her hospital records at the time and despite requesting it, we still did not get it.  However, based on reviewing cardiology note patient was found to have pulmonary embolism in the hospital and has been started on blood thinners.  She has not had any other syncopal / presyncopal episodes.  She had unsteady gait following the syncopes after hitting her head and  she was sent for EMG nerve conduction study of the lower extremities which show evidence of peripheral neuropathy which most likely was secondary to her diabetes since her A1c was over 8 last time.\par \par \par \par \par \par \par \par \par \par \par \par \par \par Ms. Greco s a 71 year old woman who comes in for an episode of Syncope 2 months ago. She reports that she passed out at a bus stop and hit her head.  She was taken to the hospital and have scan of the brain which was normal. \par

## 2023-02-08 NOTE — ASSESSMENT
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 \par \par Pulmonary embolism\par \par  continue with blood thinners\par -  Follow-up with cardiology  as scheduled\par \par \par \par \par I, Carline Thomason, Attest that this documentation has been prepared under the direction and in the presence of Provider Espinoza Ledesma DO\par \par Thank You for letting me assist in the management of this patient. \par \par Espinoza Ledesma DO\par Board Certified, Neurology\par

## 2023-04-11 ENCOUNTER — APPOINTMENT (OUTPATIENT)
Dept: SPEECH THERAPY | Facility: CLINIC | Age: 73
End: 2023-04-11

## 2023-04-11 ENCOUNTER — OUTPATIENT (OUTPATIENT)
Dept: OUTPATIENT SERVICES | Facility: HOSPITAL | Age: 73
LOS: 1 days | End: 2023-04-11
Payer: MEDICARE

## 2023-04-11 DIAGNOSIS — R42 DIZZINESS AND GIDDINESS: ICD-10-CM

## 2023-04-11 PROCEDURE — 92537 CALORIC VSTBLR TEST W/REC: CPT

## 2023-04-11 PROCEDURE — 92540 BASIC VESTIBULAR EVALUATION: CPT

## 2023-04-20 ENCOUNTER — APPOINTMENT (OUTPATIENT)
Dept: NEUROLOGY | Facility: CLINIC | Age: 73
End: 2023-04-20
Payer: MEDICARE

## 2023-04-20 VITALS
SYSTOLIC BLOOD PRESSURE: 149 MMHG | HEIGHT: 62 IN | WEIGHT: 140 LBS | DIASTOLIC BLOOD PRESSURE: 79 MMHG | HEART RATE: 61 BPM | BODY MASS INDEX: 25.76 KG/M2

## 2023-04-20 DIAGNOSIS — G62.9 POLYNEUROPATHY, UNSPECIFIED: ICD-10-CM

## 2023-04-20 PROCEDURE — 99214 OFFICE O/P EST MOD 30 MIN: CPT

## 2023-04-20 NOTE — HISTORY OF PRESENT ILLNESS
[FreeTextEntry1] : Ms. GEO CARMICHAEL returns to the office for follow-up and her prior history and physical have been reviewed and she reports no change since last visit.  she was initially seen for syncope and after [facility] record, it was clear this was secondary to dvt causing PE.  she has been on blood thinner.  additionally, she was complaining of neck pain and paresthesia in her feet.  she went for MRI of the cervicla spine which showed degenerative changes with spinal cord impingement and evidence of myelomalacia.  She was to start physical therapy but has not done it yet.  EMG showed peripheral neuropathy that was attributed to her history of diabetes as other blood work came back normal\par \par \par \par \par \par \par \par

## 2023-04-20 NOTE — ASSESSMENT
[FreeTextEntry1] : peripheral neuropathy\par \par - Gait training and fall prevention. \par -ENG/VAT as scheduled by ENT\par - Vestibular Therapy. \par \par cervical spondylosis/myelomalacia\par -physical therapy but recommend against neck manipulation. patient reported understanding\par - I Will Follow up with her In Three Months. \par \par \par I, Carline Thomason, Attest that this documentation has been prepared under the direction and in the presence of Provider Espinoza Ledesma DO\par \par Thank You for letting me assist in the management of this patient. \par \par Espinoza Ledesma DO\melissa Board Certified, Neurology\par

## 2023-04-27 ENCOUNTER — APPOINTMENT (OUTPATIENT)
Dept: CARDIOLOGY | Facility: CLINIC | Age: 73
End: 2023-04-27
Payer: MEDICARE

## 2023-04-27 VITALS — DIASTOLIC BLOOD PRESSURE: 80 MMHG | RESPIRATION RATE: 18 BRPM | SYSTOLIC BLOOD PRESSURE: 120 MMHG | HEART RATE: 56 BPM

## 2023-04-27 VITALS — HEIGHT: 62 IN | HEART RATE: 52 BPM | BODY MASS INDEX: 26.13 KG/M2 | WEIGHT: 142 LBS

## 2023-04-27 DIAGNOSIS — U07.1 COVID-19: ICD-10-CM

## 2023-04-27 DIAGNOSIS — I26.99 OTHER PULMONARY EMBOLISM W/OUT ACUTE COR PULMONALE: ICD-10-CM

## 2023-04-27 PROCEDURE — 99214 OFFICE O/P EST MOD 30 MIN: CPT | Mod: 25

## 2023-04-27 PROCEDURE — 93000 ELECTROCARDIOGRAM COMPLETE: CPT

## 2023-06-09 ENCOUNTER — APPOINTMENT (OUTPATIENT)
Dept: OTOLARYNGOLOGY | Facility: CLINIC | Age: 73
End: 2023-06-09
Payer: MEDICARE

## 2023-06-09 VITALS — BODY MASS INDEX: 25.97 KG/M2 | WEIGHT: 142 LBS

## 2023-06-09 DIAGNOSIS — H90.A31 MIXED CONDUCTIVE AND SENSORINEURAL HEARING LOSS, UNILATERAL, RIGHT EAR WITH RESTRICTED HEARING ON THE  CONTRALATERAL SIDE: ICD-10-CM

## 2023-06-09 DIAGNOSIS — H93.13 TINNITUS, BILATERAL: ICD-10-CM

## 2023-06-09 PROCEDURE — 99213 OFFICE O/P EST LOW 20 MIN: CPT

## 2023-06-09 NOTE — ASSESSMENT
[FreeTextEntry1] : Patient declines CT temporal bone due to concerns of radiation\par RV 6 months, repeat audio then

## 2023-06-09 NOTE — HISTORY OF PRESENT ILLNESS
[de-identified] : Patient presents today c/o  decreased hearing , imbalanced.  Patient states over the years her hearing is decreasing .  She had a Audiogram done 04/11/2023.  She also c/o feeling imbalanced the last few months.  She denies any otalgia but her ears are itchy.

## 2023-07-14 ENCOUNTER — APPOINTMENT (OUTPATIENT)
Dept: OTOLARYNGOLOGY | Facility: CLINIC | Age: 73
End: 2023-07-14
Payer: MEDICARE

## 2023-07-14 PROCEDURE — V5299A: CUSTOM

## 2023-07-24 LAB
ALBUMIN SERPL ELPH-MCNC: 4.5 G/DL
ALP BLD-CCNC: 110 U/L
ALT SERPL-CCNC: 17 U/L
ANION GAP SERPL CALC-SCNC: 13 MMOL/L
AST SERPL-CCNC: 22 U/L
BILIRUB SERPL-MCNC: 0.3 MG/DL
BUN SERPL-MCNC: 13 MG/DL
CALCIUM SERPL-MCNC: 9.6 MG/DL
CHLORIDE SERPL-SCNC: 106 MMOL/L
CHOLEST SERPL-MCNC: 212 MG/DL
CO2 SERPL-SCNC: 21 MMOL/L
CREAT SERPL-MCNC: 1 MG/DL
EGFR: 59 ML/MIN/1.73M2
ESTIMATED AVERAGE GLUCOSE: 166 MG/DL
GLUCOSE SERPL-MCNC: 95 MG/DL
HBA1C MFR BLD HPLC: 7.4 %
HDLC SERPL-MCNC: 65 MG/DL
LDLC SERPL CALC-MCNC: 130 MG/DL
NONHDLC SERPL-MCNC: 147 MG/DL
POTASSIUM SERPL-SCNC: 4.4 MMOL/L
PROT SERPL-MCNC: 8.4 G/DL
SODIUM SERPL-SCNC: 140 MMOL/L
TRIGL SERPL-MCNC: 85 MG/DL

## 2023-07-26 ENCOUNTER — APPOINTMENT (OUTPATIENT)
Dept: NEUROLOGY | Facility: CLINIC | Age: 73
End: 2023-07-26
Payer: MEDICARE

## 2023-07-26 VITALS
DIASTOLIC BLOOD PRESSURE: 81 MMHG | HEIGHT: 62 IN | WEIGHT: 142 LBS | HEART RATE: 58 BPM | SYSTOLIC BLOOD PRESSURE: 148 MMHG | BODY MASS INDEX: 26.13 KG/M2

## 2023-07-26 DIAGNOSIS — M54.12 RADICULOPATHY, CERVICAL REGION: ICD-10-CM

## 2023-07-26 DIAGNOSIS — G56.03 CARPAL TUNNEL SYNDROM,BILATERAL UPPER LIMBS: ICD-10-CM

## 2023-07-26 DIAGNOSIS — G62.9 POLYNEUROPATHY, UNSPECIFIED: ICD-10-CM

## 2023-07-26 PROCEDURE — 99215 OFFICE O/P EST HI 40 MIN: CPT

## 2023-07-26 RX ORDER — PREGABALIN 50 MG/1
50 CAPSULE ORAL TWICE DAILY
Qty: 60 | Refills: 0 | Status: ACTIVE | COMMUNITY
Start: 2023-07-26 | End: 1900-01-01

## 2023-07-26 NOTE — HISTORY OF PRESENT ILLNESS
Patient is returning call, please see message below. Patient would not like to continue the CT or PT at this time. She said she has been feeling great lately.    Arnie Response: N/A  Callback Number: [phone]  Best Availability: anything  Can A Detailed Message Be Left? yes  Did you confirm the message with the caller?: yes    Thank you,  Dara David     [FreeTextEntry1] : Origianl Presentation : Ms. GEO CARMICHAEL returns to the office for follow-up and her prior history and physical have been reviewed and she reports no change since last visit.  she was initially seen for syncope and [facility], it was clear this was secondary to dvt causing PE.  she has been on blood thinner.  additionally, she was complaining of neck pain and paresthesia in her feet.  she went for MRI of the cervical spine which showed degenerative changes with spinal cord impingement and evidence of myelomalacia.  She was to start physical therapy but has not done it yet.  EMG showed peripheral neuropathy that was attributed to her history of diabetes as other blood work came back normal. \par \par Audiology testing : Geotropic nystagmus noted in right position consider BPPV, return \par \par Today : Today I had the pleasure of seeing Ms Leung in our office for follow up.  The patients previous history and physical findings have been reviewed. \par  \par Ms Leung remains under our care for diabetic polyneuropathy last A1c 7.4 (7.24.23) serum glucose 166 as well as chronic cervical myelopathy with episodic dizziness. Today we reviewed her ENG/ VNG testing. she states that approx 5-7 years ago she saw a neurosurgeon for her neck pain and was recommended surgical intervention but she deferred. Today she reports persistent neck pain primarily to her right side as well as generalized weakness and dizziness. \par \par \par \par \par \par

## 2023-07-26 NOTE — PHYSICAL EXAM
[Person] : oriented to person [Place] : oriented to place [Time] : oriented to time [Concentration Intact] : normal concentrating ability [Visual Intact] : visual attention was ~T not ~L decreased [Naming Objects] : no difficulty naming common objects [Repeating Phrases] : no difficulty repeating a phrase [Writing A Sentence] : no difficulty writing a sentence [Fluency] : fluency intact [Comprehension] : comprehension intact [Reading] : reading intact [Past History] : adequate knowledge of personal past history [Cranial Nerves Optic (II)] : visual acuity intact bilaterally,  visual fields full to confrontation, pupils equal round and reactive to light [Cranial Nerves Oculomotor (III)] : extraocular motion intact [Cranial Nerves Trigeminal (V)] : facial sensation intact symmetrically [Cranial Nerves Facial (VII)] : face symmetrical [Cranial Nerves Vestibulocochlear (VIII)] : hearing was intact bilaterally [Cranial Nerves Glossopharyngeal (IX)] : tongue and palate midline [Cranial Nerves Accessory (XI - Cranial And Spinal)] : head turning and shoulder shrug symmetric [Cranial Nerves Hypoglossal (XII)] : there was no tongue deviation with protrusion [No Muscle Atrophy] : normal bulk in all four extremities [Sensation Tactile Decrease] : light touch was intact [0] : Ankle jerk left 0 [Oriented To Time, Place, And Person] : oriented to person, place, and time [Affect] : the affect was normal [Memory Recent] : recent memory was not impaired [Past-pointing] : there was no past-pointing [Tremor] : no tremor present [Plantar Reflex Right Only] : normal on the right [Plantar Reflex Left Only] : normal on the left [FreeTextEntry6] :  bilateral  weakness 4-/ 5 [FreeTextEntry8] :  positive Romberg, trouble with heel /toe /tandem [PERRL With Normal Accommodation] : pupils were equal in size, round, reactive to light, with normal accommodation [Extraocular Movements] : extraocular movements were intact [FreeTextEntry1] : ambulates with cane

## 2023-07-26 NOTE — ASSESSMENT
[FreeTextEntry1] : 73 year old female with chronic cervical neuropathy with evidence of myelomalacia and diabetic polyneuropathy. We discussed the importance of following up with her PCP and endocrinologist for better diabetic control as her last A1c was 7.4 and she admits to not being compliant with her medication. She will discontinue Gabapentin and will trial Lyrica 50mg 1 tab BID for chronic pain and will follow up with pain management. \par \par I have personally reviewed with the PA, this patient’s history and physical exam findings, as documented above. I have discussed the relevant areas of concern, having direct implications to the presenting problems and illnesses, and have personally examined all pertinent findings which impact on the prior neurological treatment. \par \par Alva Rowan MS, PA-C\par Espinoza Ledesma, \par

## 2023-07-27 ENCOUNTER — APPOINTMENT (OUTPATIENT)
Dept: CARDIOLOGY | Facility: CLINIC | Age: 73
End: 2023-07-27
Payer: MEDICARE

## 2023-07-27 VITALS — BODY MASS INDEX: 25.21 KG/M2 | HEART RATE: 62 BPM | WEIGHT: 137 LBS | HEIGHT: 62 IN

## 2023-07-27 VITALS — DIASTOLIC BLOOD PRESSURE: 80 MMHG | SYSTOLIC BLOOD PRESSURE: 120 MMHG | RESPIRATION RATE: 18 BRPM

## 2023-07-27 PROCEDURE — 93000 ELECTROCARDIOGRAM COMPLETE: CPT

## 2023-07-27 PROCEDURE — 99214 OFFICE O/P EST MOD 30 MIN: CPT | Mod: 25

## 2023-07-27 RX ORDER — METOPROLOL SUCCINATE 50 MG/1
50 TABLET, EXTENDED RELEASE ORAL DAILY
Qty: 90 | Refills: 3 | Status: ACTIVE | COMMUNITY
Start: 2017-10-12 | End: 1900-01-01

## 2023-08-23 ENCOUNTER — APPOINTMENT (OUTPATIENT)
Dept: NEUROLOGY | Facility: CLINIC | Age: 73
End: 2023-08-23

## 2023-11-09 ENCOUNTER — APPOINTMENT (OUTPATIENT)
Dept: OTOLARYNGOLOGY | Facility: CLINIC | Age: 73
End: 2023-11-09

## 2023-12-07 ENCOUNTER — OUTPATIENT (OUTPATIENT)
Dept: OUTPATIENT SERVICES | Facility: HOSPITAL | Age: 73
LOS: 1 days | End: 2023-12-07
Payer: MEDICARE

## 2023-12-07 DIAGNOSIS — Z12.31 ENCOUNTER FOR SCREENING MAMMOGRAM FOR MALIGNANT NEOPLASM OF BREAST: ICD-10-CM

## 2023-12-07 DIAGNOSIS — M85.80 OTHER SPECIFIED DISORDERS OF BONE DENSITY AND STRUCTURE, UNSPECIFIED SITE: ICD-10-CM

## 2023-12-07 DIAGNOSIS — Z13.820 ENCOUNTER FOR SCREENING FOR OSTEOPOROSIS: ICD-10-CM

## 2023-12-07 PROCEDURE — 77080 DXA BONE DENSITY AXIAL: CPT

## 2023-12-07 PROCEDURE — 77067 SCR MAMMO BI INCL CAD: CPT

## 2023-12-07 PROCEDURE — 77063 BREAST TOMOSYNTHESIS BI: CPT

## 2023-12-07 PROCEDURE — 77067 SCR MAMMO BI INCL CAD: CPT | Mod: 26

## 2023-12-07 PROCEDURE — 77063 BREAST TOMOSYNTHESIS BI: CPT | Mod: 26

## 2023-12-08 DIAGNOSIS — Z12.31 ENCOUNTER FOR SCREENING MAMMOGRAM FOR MALIGNANT NEOPLASM OF BREAST: ICD-10-CM

## 2023-12-08 DIAGNOSIS — Z13.820 ENCOUNTER FOR SCREENING FOR OSTEOPOROSIS: ICD-10-CM

## 2024-01-22 ENCOUNTER — RX RENEWAL (OUTPATIENT)
Age: 74
End: 2024-01-22

## 2024-01-22 RX ORDER — ROSUVASTATIN CALCIUM 5 MG/1
5 TABLET, FILM COATED ORAL
Qty: 90 | Refills: 3 | Status: ACTIVE | COMMUNITY
Start: 2024-01-22 | End: 1900-01-01

## 2024-01-25 ENCOUNTER — NON-APPOINTMENT (OUTPATIENT)
Age: 74
End: 2024-01-25

## 2024-02-06 LAB
ALBUMIN SERPL ELPH-MCNC: 4.4 G/DL
ALP BLD-CCNC: 106 U/L
ALT SERPL-CCNC: 13 U/L
ANION GAP SERPL CALC-SCNC: 12 MMOL/L
AST SERPL-CCNC: 23 U/L
BILIRUB SERPL-MCNC: 0.4 MG/DL
BUN SERPL-MCNC: 16 MG/DL
CALCIUM SERPL-MCNC: 9.6 MG/DL
CHLORIDE SERPL-SCNC: 104 MMOL/L
CHOLEST SERPL-MCNC: 222 MG/DL
CO2 SERPL-SCNC: 23 MMOL/L
CREAT SERPL-MCNC: 1.1 MG/DL
EGFR: 53 ML/MIN/1.73M2
ESTIMATED AVERAGE GLUCOSE: 174 MG/DL
GLUCOSE SERPL-MCNC: 129 MG/DL
HBA1C MFR BLD HPLC: 7.7 %
HDLC SERPL-MCNC: 86 MG/DL
LDLC SERPL CALC-MCNC: 119 MG/DL
NONHDLC SERPL-MCNC: 136 MG/DL
POTASSIUM SERPL-SCNC: 4.6 MMOL/L
PROT SERPL-MCNC: 8.6 G/DL
SODIUM SERPL-SCNC: 139 MMOL/L
TRIGL SERPL-MCNC: 84 MG/DL

## 2024-02-22 ENCOUNTER — NON-APPOINTMENT (OUTPATIENT)
Age: 74
End: 2024-02-22

## 2024-04-01 ENCOUNTER — APPOINTMENT (OUTPATIENT)
Dept: CARDIOLOGY | Facility: CLINIC | Age: 74
End: 2024-04-01
Payer: MEDICARE

## 2024-04-01 ENCOUNTER — RESULT CHARGE (OUTPATIENT)
Age: 74
End: 2024-04-01

## 2024-04-01 VITALS — RESPIRATION RATE: 18 BRPM | SYSTOLIC BLOOD PRESSURE: 118 MMHG | DIASTOLIC BLOOD PRESSURE: 80 MMHG | HEART RATE: 60 BPM

## 2024-04-01 VITALS — BODY MASS INDEX: 25.95 KG/M2 | WEIGHT: 141 LBS | HEIGHT: 62 IN

## 2024-04-01 DIAGNOSIS — I25.3 ANEURYSM OF HEART: ICD-10-CM

## 2024-04-01 DIAGNOSIS — E11.9 TYPE 2 DIABETES MELLITUS W/OUT COMPLICATIONS: ICD-10-CM

## 2024-04-01 DIAGNOSIS — E78.5 HYPERLIPIDEMIA, UNSPECIFIED: ICD-10-CM

## 2024-04-01 DIAGNOSIS — I10 ESSENTIAL (PRIMARY) HYPERTENSION: ICD-10-CM

## 2024-04-01 DIAGNOSIS — I34.0 NONRHEUMATIC MITRAL (VALVE) INSUFFICIENCY: ICD-10-CM

## 2024-04-01 PROCEDURE — 99214 OFFICE O/P EST MOD 30 MIN: CPT

## 2024-04-01 PROCEDURE — 93000 ELECTROCARDIOGRAM COMPLETE: CPT

## 2024-04-01 PROCEDURE — G2211 COMPLEX E/M VISIT ADD ON: CPT

## 2024-04-01 NOTE — PHYSICAL EXAM
"12/13/2018    MEDICARE ANNUAL WELLNESS VISIT    Grace Beauchamp is a 77 y.o. female who presents for SUBSEQUENT AWV    Patient's general assessment of her health since a year ago:     - Compared to one year ago, she feels her physical health is about the same without significant change.    - Compared to one year ago, she feels her mental health is about the same without significant change.      HPI for other active medical problems:         * The required components of Health Risk Assessment (HRA) that were completed by the patient and/or my staff are contained within this note and in the scanned documents titled \"Health Risk Assessment\" within the media section of the patient's chart in [facility].       HISTORY    Recent Hospitalizations:    Recent hospitalization? : Yes    If YES, location, date, and diagnoses:     · Location:    · Date: 12/2/18- 12/4/18   · Principle Discharge Dx: COPD exacerbation   · Secondary Dx:       Patient Care Team:    Patient Care Team:  Miller Castañeda MD as PCP - General (Internal Medicine)  Miller Castañeda MD as PCP - Claims Attributed  Mart Ontiveros MD as Surgeon (Cardiothoracic Surgery)  Tres De Los Santos MD as Consulting Physician (Cardiology)  Graham Mcconnell MD as Consulting Physician (Hematology and Oncology)  Payam Byrnes MD as Consulting Physician (Otolaryngology)  Jonathan Smith MD as Consulting Physician (Vascular Surgery)  Victor M Cabral MD as Surgeon (Orthopedic Surgery)      Allergies:  Ramipril; Morphine; Morphine and related; and Bactrim [sulfamethoxazole-trimethoprim]    Medications:  Outpatient Medications Prior to Visit   Medication Sig Dispense Refill   • albuterol (PROVENTIL) (2.5 MG/3ML) 0.083% nebulizer solution Take 2.5 mg by nebulization 4 (Four) Times a Day As Needed for Wheezing. 125 vial 11   • ANORO ELLIPTA 62.5-25 MCG/INH aerosol powder  inhaler      • aspirin 81 MG EC tablet Take 81 mg by mouth daily.     • Biotin 1000 MCG tablet Take 1,000 " [General Appearance - Well Developed] : well developed mcg by mouth daily. Take as directed     • BYSTOLIC 10 MG tablet TAKE ONE TABLET BY MOUTH DAILY 30 tablet 5   • cetirizine (zyrTEC) 10 MG tablet Take 1 tablet by mouth As Needed for Allergies. 30 tablet 0   • cilostazol (PLETAL) 100 MG tablet Take 1 tablet by mouth 2 (two) times a day.     • melatonin 5 MG tablet tablet Take 2 tablets by mouth At Night As Needed (sleep). 30 tablet 0   • pitavastatin calcium (LIVALO) 2 MG tablet tablet Take 1 tablet by mouth Every Night. 30 tablet 1   • pramipexole (MIRAPEX) 0.125 MG tablet TAKE ONE TABLET BY MOUTH EVERY NIGHT AT BEDTIME 90 tablet 0   • PROAIR  (90 Base) MCG/ACT inhaler INHALE TWO PUFFS BY MOUTH EVERY 6 HOURS AS NEEDED FOR WHEEZING 1 inhaler 3   • Probiotic Product (PROBIOTIC PO) Take 1 capsule by mouth Daily.     • traMADol (ULTRAM) 50 MG tablet TAKE ONE TABLET BY MOUTH EVERY 6 HOURS AS NEEDED FOR MODERATE PAIN 90 tablet 1   • Fluticasone-Umeclidin-Vilant (TRELEGY ELLIPTA) 100-62.5-25 MCG/INH aerosol powder  Inhale 1 each Daily. 28 each 0   • diclofenac (VOLTAREN) 1 % gel gel Apply 4 g topically to the appropriate area as directed 3 (Three) Times a Day.     • meloxicam (MOBIC) 15 MG tablet TAKE ONE TABLET BY MOUTH DAILY 30 tablet 0   • predniSONE (DELTASONE) 20 MG tablet Take 1 tablet by mouth Daily With Breakfast. 3 tablet 0     No facility-administered medications prior to visit.        PFSH:     The following portions of the patient's history were reviewed and updated as appropriate: Allergies / Current Medications / Past Medical History / Surgical History / Social History / Family History    Problem List:  Patient Active Problem List   Diagnosis   • History of breast cancer   • Stenosis of carotid artery   • Chronic obstructive pulmonary disease (CMS/HCC)   • Closed fracture of multiple ribs   • Cognitive disorder   • Erythromelalgia (CMS/HCC)   • Hyperlipidemia   • Hypertension   • Primary osteoarthritis involving multiple joints   • Osteoporosis   •  Restless legs syndrome   • Cerebral aneurysm   • Temporary cerebral vascular dysfunction   • S/P CABG x 1   • Type 2 diabetes mellitus without complication, without long-term current use of insulin (CMS/[facility])   • S/P ascending aortic aneurysm repair   • Aortic arch aneurysm (CMS/HCC)   • Descending thoracic aortic aneurysm (CMS/HCC)   • Claudication of both lower extremities (CMS/HCC)   • Thoracoabdominal aortic aneurysm (CMS/HCC)   • Ventral hernia without obstruction or gangrene   • Breast cancer, stage 1, estrogen receptor positive (CMS/HCC)   • Imbalance   • Arthritis of right hip   • Arthritis of right knee   • Chondrocalcinosis   • Chronic pain of right knee   • Degenerative disc disease, lumbar       Past Medical History:  Past Medical History:   Diagnosis Date   • AAA (abdominal aortic aneurysm) (CMS/[facility])    • Aneurysm (CMS/HCC)     saccular   • Aneurysm of aorta (CMS/[facility])     abdominal aorta and arch, descending aorta   • Aortic arch aneurysm (CMS/HCC)    • Arthritis    • Breast cancer (CMS/[facility])     right breast [id number] (snm) pMX ER/LA pos, Her-2/neg, Ki-67, 31%, oncotype recurrence score 19, invasive lobular carcinoma   • Cancer of subglottis (CMS/[facility])    • Carotid artery stenosis    • Closed fracture of three ribs of right side    • Cognitive disorder    • COPD (chronic obstructive pulmonary disease) (CMS/[facility])    • Coronary artery disease    • Depression    • Descending aortic arch aneurysm (CMS/HCC)    • Diabetes mellitus (CMS/HCC)    • Erythermalgia (CMS/[facility])    • GERD (gastroesophageal reflux disease)    • Hyperlipidemia    • Hypertension    • Low back pain    • Osteoarthritis    • Osteoporosis    • Prediabetes    • RLS (restless legs syndrome)    • Saccular aneurysm    • Stenosis of artery (CMS/[facility])     carotid   • Stroke (CMS/[facility])     Perioperatively with left hip replacement   • TIA (transient ischemic attack)    • Venous insufficiency        Past Surgical History:  Past Surgical History:   Procedure  [Normal Appearance] : normal appearance Laterality Date   • AORTIC VALVE REPAIR/REPLACEMENT N/A 2016    ascending aortic replacement with 24 mm graft, Dr. Ontiveros   • APPENDECTOMY     • BREAST BIOPSY Right 2012    vacuum assisted core bx of the right breast   • CARDIAC CATHETERIZATION N/A 2016    Dr. Tres De Los Santos   • CARDIAC SURGERY  2016    Dr. Ontiveros/Dr. De Los Santos   • CATARACT EXTRACTION Bilateral     [facility] [place]   • COLONOSCOPY N/A 10/2002    Dr. Negro   • CORONARY ARTERY BYPASS GRAFT  02/23/2016    X 1 Dr Ontiveros   • CYST REMOVAL Right 2013    right palm excision of retinacular cyst, Dr. Karla Fish   • EPIDURAL BLOCK     • LAPAROSCOPIC CHOLECYSTECTOMY N/A 2004    Dr. JOEY Sheth   • MASTECTOMY Right 2012   • ORIF HIP FRACTURE Left 2005    w/ AMBI compression screw, Dr. Victor M Cabral   • RECTOVAGINAL FISTULA REPAIR     • TUBAL ABDOMINAL LIGATION         Social History:  Social History     Socioeconomic History   • Marital status:      Spouse name: Not on file   • Number of children: Not on file   • Years of education: Not on file   • Highest education level: Not on file   Tobacco Use   • Smoking status: Former Smoker     Last attempt to quit: 2012     Years since quittin.0   • Smokeless tobacco: Never Used   • Tobacco comment: caffeine use   Substance and Sexual Activity   • Alcohol use: No   • Drug use: Defer   • Sexual activity: Defer       Family History:  Family History   Problem Relation Age of Onset   • Alzheimer's disease Mother    • Hypertension Mother    • Cancer Maternal Aunt    • Heart disease Father    • Heart failure Father    • Hypertension Father    • Diabetes Father    • Liver disease Sister    • Heart failure Brother    • Hypertension Brother    • Alcohol abuse Brother    • No Known Problems Maternal Grandmother    • No Known Problems Maternal Grandfather    • No Known Problems Paternal Grandmother    • No Known Problems Paternal Grandfather    •  [Well Groomed] : well groomed "Diabetes Brother    • Brain cancer Brother    • Heart disease Brother          PATIENT ASSESSMENT    Vitals:  /72   Pulse 86   Temp 98.1 °F (36.7 °C)   Ht 142.2 cm (56\")   Wt 47.8 kg (105 lb 6.4 oz)   SpO2 92%   BMI 23.63 kg/m²   BP Readings from Last 3 Encounters:   12/13/18 132/72   12/04/18 161/98   08/15/18 130/80     Wt Readings from Last 3 Encounters:   12/13/18 47.8 kg (105 lb 6.4 oz)   12/02/18 46 kg (101 lb 6.4 oz)   11/06/18 48.5 kg (107 lb)      Body mass index is 23.63 kg/m².    Pain Score    12/13/18 1306   PainSc: 0-No pain         Review of Systems:    Review of Systems   Constitutional: Negative for chills and fever.   Respiratory: Positive for shortness of breath (improved).    Cardiovascular: Negative for chest pain and leg swelling.   Genitourinary: Positive for frequency.         Physical Exam:    Physical Exam   Constitutional: She is oriented to person, place, and time. Vital signs are normal. She appears well-developed and well-nourished. She is cooperative. She does not appear ill. No distress.   HENT:   Right Ear: Tympanic membrane, external ear and ear canal normal. Decreased hearing is noted.   Left Ear: Tympanic membrane, external ear and ear canal normal. Decreased hearing is noted.   Cardiovascular: Normal rate, regular rhythm, S1 normal, S2 normal and normal heart sounds.   No murmur heard.  Pulmonary/Chest: Effort normal and breath sounds normal. She has no decreased breath sounds. She has no wheezes. She has no rhonchi. She has no rales.   Neurological: She is alert and oriented to person, place, and time.   Skin: Skin is warm, dry and intact.   Psychiatric: She has a normal mood and affect. Her speech is normal and behavior is normal. Judgment and thought content normal. Cognition and memory are normal.   Nursing note and vitals reviewed.        Reviewed Data:    Labs:   Lab Results   Component Value Date     12/02/2018    K 3.9 12/02/2018    AST 19 12/02/2018    " [No Deformities] : no deformities ALT 16 12/02/2018    BUN 20 12/02/2018    CREATININE 0.79 12/02/2018    CREATININE 0.85 08/15/2018    CREATININE 0.80 04/26/2018    EGFRIFNONA 70 12/02/2018    EGFRIFAFRI 76 08/15/2018       Lab Results   Component Value Date     (H) 08/15/2018     (H) 11/01/2016    HGBA1C 6.8 (H) 08/15/2018    HGBA1C 6.29 (H) 03/03/2017    HGBA1C 6.9 (H) 12/08/2015    MICROALBUR 69.2 03/03/2017       Lab Results   Component Value Date    LDL 54 08/15/2018     (H) 03/03/2017    HDL 70 08/15/2018    TRIG 99 08/15/2018    CHOLHDLRATIO 2.1 08/15/2018       No results found for: TSH, FREET4       Lab Results   Component Value Date    WBC 8.82 12/02/2018    HGB 16.5 (H) 12/02/2018    HGB 14.1 06/28/2016    HGB 11.5 (L) 02/29/2016     12/02/2018                 No results found for: PSA    Imaging:          Medical Tests:          Screening for Glaucoma:  Previous screening for glaucoma?: No      Hearing Loss Screen:  Finger Rub Hearing Test (right ear): failed  Finger Rub Hearing Test (left ear): failed      Urinary Incontinence Screen:  Episodes of urinary incontinence? : Yes, severe incontinence. Reports Dr. Castañeda will not prescribe medication r/t side effects.       Depression Screen:  PHQ-2/PHQ-9 Depression Screening 12/13/2018   Little interest or pleasure in doing things 1   Feeling down, depressed, or hopeless 0   Total Score 1   If you checked off any problems, how difficult have these problems made it for you to do your work, take care of things at home, or get along with other people? -        PHQ-2: 1 (Not depressed)    PHQ-9: 1-4 (Minimal Depression)       FUNCTIONAL, FALL RISK, & COGNITIVE SCREENING (Components below):    DATA:    Functional & Cognitive Status 12/13/2018   Do you have difficulty preparing food and eating? No   Do you have difficulty bathing yourself, getting dressed or grooming yourself? No   Do you have difficulty using the toilet? No   Do you have difficulty moving around from  [General Appearance - Well Nourished] : well nourished [General Appearance - In No Acute Distress] : no acute distress place to place? No   Do you have trouble with steps or getting out of a bed or a chair? No   In the past year have you fallen or experienced a near fall? Yes   Do you need help using the phone?  No   Are you deaf or do you have serious difficulty hearing?  No   Do you need help with transportation? No   Do you need help shopping? No   Do you need help preparing meals?  No   Do you need help with housework?  Yes   Do you need help with laundry? No   Do you need help taking your medications? No   Do you need help managing money? No   Do you ever drive or ride in a car without wearing a seat belt? No   Do you have difficulty concentrating, remembering or making decisions? Yes       Fall Risk Assessment  Fallen in past 6 months: 0--> No  Mental Status: 0--> no mental status change  Mobility: 2--> Requires assistance- transfer, walker, etc.  Medications: 1--> Narcotics  Total Fall Risk Score: 5    A) Assessment of Functional Ability:  (Assessment of ability to perform ADL's (showering/bathing, using toilet, dressing, feeding self, moving self around) and IADL's (use telephone, shop, prepare food, housekeep, do laundry, transport independently, take medications independently, and handle finances)    Degree of functional impairment: MODERATE (based on assessment noted above)      B) Assessment of Fall Risk:     - Fall within the last year? : Yes   - Feel unsteady when standing or walking? : Yes   - Worry about falling? : Yes    Need for further evaluation of gait, strength, and balance? : Yes    Timed Up and Go (TUG):   (>= 12 seconds indicates high risk for falling)    Observable abnormalities included: Normal gait pattern  slow tentative pace       C. Assessment of Cognitive Function:    Mini-Cog Test:     1) Registration (3 objects): N/A   2) Clock Draw: Passed? : Yes   3) Number of objects recalled: 2    Further evaluation required? : No      COUNSELING    A. Identification of Health Risk Factors:    Risk factors  [Normal Conjunctiva] : the conjunctiva exhibited no abnormalities include: polypharmacy, increased fall risk, chronic pain, history of tobacco use, poor hearing and incontinence      B. Age-Appropriate Screening Schedule:  (Refer to the list below for future screening recommendations based on patient's age, sex and/or medical conditions. Orders for these recommended tests are listed in the plan section. The patient has been provided with a written plan)    Health Maintenance Topics  Health Maintenance   Topic Date Due   • ZOSTER VACCINE (2 of 2) 11/27/2002   • COLONOSCOPY  06/22/2016   • URINE MICROALBUMIN  03/03/2018   • DXA SCAN  07/15/2018   • HEMOGLOBIN A1C  02/15/2019   • LIPID PANEL  08/15/2019   • MAMMOGRAM  02/01/2020   • TDAP/TD VACCINES (2 - Td) 03/24/2027   • INFLUENZA VACCINE  Completed   • PNEUMOCOCCAL VACCINES (65+ LOW/MEDIUM RISK)  Completed       Health Maintenance Topics Due or Over-Due  Health Maintenance Due   Topic Date Due   • HEPATITIS A VACCINE ADULT (1 of 2) 07/29/1959   • ZOSTER VACCINE (2 of 2) 11/27/2002   • COLONOSCOPY  06/22/2016   • URINE MICROALBUMIN  03/03/2018   • MEDICARE ANNUAL WELLNESS  03/24/2018   • DXA SCAN  07/15/2018         C. Advanced Care Planning:    has an advanced directive which is on file and has a medical power of       D. Patient Self-Management and Personalized Health Advice:    She has been provided with personalized counseling/information (including brochures/handouts) about:     -- recommended hearing testing, fall prevention and management of sleep disorders    She has been recommended for the following preventative services which has been performed today, will be ordered today or ordered/performed on upcoming follow-up visit:     -- screening for osteoporosis (DEXA ordered), vaccination for Shingrix administered  (or recommended at pharmacy)      E. Miscellaneous Items:    -Aspirin use counseling: Taking ASA appropriately as indicated    -Discussed BMI with her. The BMI is in the acceptable range    -Reviewed use of  [Eyelids - No Xanthelasma] : the eyelids demonstrated no xanthelasmas high risk medication in the elderly: YES    -Reviewed for potential of harmful drug interactions in the elderly: YES      IV. WRAP-UP    Assessment & Plan:    1) MEDICARE ANNUAL WELLNESS VISIT    2) OTHER MEDICAL CONDITIONS ADDRESSED TODAY:              Problem List Items Addressed This Visit     None                  No orders of the defined types were placed in this encounter.      Discussion / Summary:    1. Medicare annual wellness visit, subsequent  Patient's daughter expresses concern about confusion only in the mornings.  Discussed with patient and daughter it could possibly be related to her restless leg medication that she takes at night possibly combined with melatonin. Instructed to continue to monitor for worsening symptoms, discuss with Dr. Castañeda next week at visit.     2. Osteoporosis, unspecified osteoporosis type, unspecified pathological fracture presence  Reports history of osteoporosis, was previously on Prolia injections as prescribed by her hematologist, has been off of them for at least 2 years now.  No recent DEXA scan, we'll order today.    - DEXA Bone Density Axial    3. Pulmonary emphysema, unspecified emphysema type (CMS/[facility])      Medications as of TODAY:              Current Outpatient Medications   Medication Sig Dispense Refill   • albuterol (PROVENTIL) (2.5 MG/3ML) 0.083% nebulizer solution Take 2.5 mg by nebulization 4 (Four) Times a Day As Needed for Wheezing. 125 vial 11   • ANORO ELLIPTA 62.5-25 MCG/INH aerosol powder  inhaler      • aspirin 81 MG EC tablet Take 81 mg by mouth daily.     • Biotin 1000 MCG tablet Take 1,000 mcg by mouth daily. Take as directed     • BYSTOLIC 10 MG tablet TAKE ONE TABLET BY MOUTH DAILY 30 tablet 5   • cetirizine (zyrTEC) 10 MG tablet Take 1 tablet by mouth As Needed for Allergies. 30 tablet 0   • cilostazol (PLETAL) 100 MG tablet Take 1 tablet by mouth 2 (two) times a day.     • melatonin 5 MG tablet tablet Take 2 tablets by mouth At Night As Needed  (sleep). 30 tablet 0   • pitavastatin calcium (LIVALO) 2 MG tablet tablet Take 1 tablet by mouth Every Night. 30 tablet 1   • pramipexole (MIRAPEX) 0.125 MG tablet TAKE ONE TABLET BY MOUTH EVERY NIGHT AT BEDTIME 90 tablet 0   • PROAIR  (90 Base) MCG/ACT inhaler INHALE TWO PUFFS BY MOUTH EVERY 6 HOURS AS NEEDED FOR WHEEZING 1 inhaler 3   • Probiotic Product (PROBIOTIC PO) Take 1 capsule by mouth Daily.     • traMADol (ULTRAM) 50 MG tablet TAKE ONE TABLET BY MOUTH EVERY 6 HOURS AS NEEDED FOR MODERATE PAIN 90 tablet 1   • Fluticasone-Umeclidin-Vilant (TRELEGY ELLIPTA) 100-62.5-25 MCG/INH aerosol powder  Inhale 1 each Daily. 28 each 0     No current facility-administered medications for this visit.          FOLLOW-UP:            No Follow-up on file.              Future Appointments   Date Time Provider Department Center   12/13/2018  1:30 PM Laura Jimenez APRN [facility] None   12/19/2018  1:00 PM Miller Castañeda MD [facility] None   5/17/2019  1:20 PM Tres De Los Santos MD [facility] None         ______________________________________________________________________      A printed After Visit Summary (AVS) including the Personalized Prevention  Plan Services (PPPS) was given to the patient at check-out today.     Educational Handouts    Strong & Stable / Balance / Physical Activity / Pain / Depression /  Forgetfulness / Healthy Eating / Alcohol / Smoking /  Medicine / Sexuality / Scams     **Dragon Disclaimer:   Much of this encounter note is an electronic transcription/translation of spoken language to printed text. The electronic translation of spoken language may permit erroneous, or at times, nonsensical words or phrases to be inadvertently transcribed. Although I have reviewed the note for such errors, some may still exist.     [Normal Oral Mucosa] : normal oral mucosa [No Oral Cyanosis] : no oral cyanosis [No Oral Pallor] : no oral pallor [Normal Jugular Venous V Waves Present] : normal jugular venous V waves present [Normal Jugular Venous A Waves Present] : normal jugular venous A waves present [No Jugular Venous Grace A Waves] : no jugular venous grace A waves [Heart Rate And Rhythm] : heart rate and rhythm were normal [Murmurs] : no murmurs present [Heart Sounds] : normal S1 and S2 [Exaggerated Use Of Accessory Muscles For Inspiration] : no accessory muscle use [Respiration, Rhythm And Depth] : normal respiratory rhythm and effort [Auscultation Breath Sounds / Voice Sounds] : lungs were clear to auscultation bilaterally [Bowel Sounds] : normal bowel sounds [Abdomen Tenderness] : non-tender [Abdomen Soft] : soft [Abdomen Mass (___ Cm)] : no abdominal mass palpated [Abnormal Walk] : normal gait [Gait - Sufficient For Exercise Testing] : the gait was sufficient for exercise testing [Cyanosis, Localized] : no localized cyanosis [Nail Clubbing] : no clubbing of the fingernails [Petechial Hemorrhages (___cm)] : no petechial hemorrhages [No Venous Stasis] : no venous stasis [] : no rash [Skin Color & Pigmentation] : normal skin color and pigmentation [Skin Lesions] : no skin lesions [No Skin Ulcers] : no skin ulcer [Oriented To Time, Place, And Person] : oriented to person, place, and time [No Xanthoma] : no  xanthoma was observed

## 2024-07-24 ENCOUNTER — RX RENEWAL (OUTPATIENT)
Age: 74
End: 2024-07-24

## 2024-07-29 ENCOUNTER — NON-APPOINTMENT (OUTPATIENT)
Age: 74
End: 2024-07-29

## 2024-09-03 ENCOUNTER — APPOINTMENT (OUTPATIENT)
Dept: CARDIOLOGY | Facility: CLINIC | Age: 74
End: 2024-09-03
Payer: MEDICARE

## 2024-09-03 DIAGNOSIS — I34.0 NONRHEUMATIC MITRAL (VALVE) INSUFFICIENCY: ICD-10-CM

## 2024-09-03 PROCEDURE — 93306 TTE W/DOPPLER COMPLETE: CPT

## 2024-09-18 ENCOUNTER — APPOINTMENT (OUTPATIENT)
Dept: NEUROLOGY | Facility: CLINIC | Age: 74
End: 2024-09-18
Payer: MEDICARE

## 2024-09-18 VITALS
DIASTOLIC BLOOD PRESSURE: 74 MMHG | WEIGHT: 140 LBS | HEART RATE: 61 BPM | SYSTOLIC BLOOD PRESSURE: 147 MMHG | HEIGHT: 62 IN | BODY MASS INDEX: 25.76 KG/M2

## 2024-09-18 DIAGNOSIS — R41.3 OTHER AMNESIA: ICD-10-CM

## 2024-09-18 DIAGNOSIS — G62.9 POLYNEUROPATHY, UNSPECIFIED: ICD-10-CM

## 2024-09-18 DIAGNOSIS — I26.99 OTHER PULMONARY EMBOLISM W/OUT ACUTE COR PULMONALE: ICD-10-CM

## 2024-09-18 PROCEDURE — 99213 OFFICE O/P EST LOW 20 MIN: CPT

## 2024-09-20 NOTE — PHYSICAL EXAM
[Person] : oriented to person [Place] : oriented to place [Time] : oriented to time [Concentration Intact] : normal concentrating ability [Visual Intact] : visual attention was ~T not ~L decreased [Naming Objects] : no difficulty naming common objects [Repeating Phrases] : no difficulty repeating a phrase [Writing A Sentence] : no difficulty writing a sentence [Fluency] : fluency intact [Comprehension] : comprehension intact [Reading] : reading intact [Past History] : adequate knowledge of personal past history [Cranial Nerves Optic (II)] : visual acuity intact bilaterally,  visual fields full to confrontation, pupils equal round and reactive to light [Cranial Nerves Oculomotor (III)] : extraocular motion intact [Cranial Nerves Trigeminal (V)] : facial sensation intact symmetrically [Cranial Nerves Facial (VII)] : face symmetrical [Cranial Nerves Vestibulocochlear (VIII)] : hearing was intact bilaterally [Cranial Nerves Glossopharyngeal (IX)] : tongue and palate midline [Cranial Nerves Accessory (XI - Cranial And Spinal)] : head turning and shoulder shrug symmetric [Cranial Nerves Hypoglossal (XII)] : there was no tongue deviation with protrusion [No Muscle Atrophy] : normal bulk in all four extremities [Sensation Tactile Decrease] : light touch was intact [0] : Ankle jerk left 0 [Oriented To Time, Place, And Person] : oriented to person, place, and time [Affect] : the affect was normal [Memory Recent] : recent memory was not impaired [PERRL With Normal Accommodation] : pupils were equal in size, round, reactive to light, with normal accommodation [Extraocular Movements] : extraocular movements were intact [Past-pointing] : there was no past-pointing [Tremor] : no tremor present [Plantar Reflex Right Only] : normal on the right [Plantar Reflex Left Only] : normal on the left [FreeTextEntry6] :  bilateral  weakness 4-/ 5 [FreeTextEntry8] :  positive Romberg, trouble with heel /toe /tandem [FreeTextEntry1] : ambulates with cane

## 2024-09-20 NOTE — PHYSICAL EXAM
[Person] : oriented to person [Place] : oriented to place [Time] : oriented to time [Concentration Intact] : normal concentrating ability [Visual Intact] : visual attention was ~T not ~L decreased [Naming Objects] : no difficulty naming common objects [Repeating Phrases] : no difficulty repeating a phrase [Writing A Sentence] : no difficulty writing a sentence [Fluency] : fluency intact [Comprehension] : comprehension intact [Reading] : reading intact [Past History] : adequate knowledge of personal past history [Cranial Nerves Optic (II)] : visual acuity intact bilaterally,  visual fields full to confrontation, pupils equal round and reactive to light [Cranial Nerves Oculomotor (III)] : extraocular motion intact [Cranial Nerves Trigeminal (V)] : facial sensation intact symmetrically [Cranial Nerves Vestibulocochlear (VIII)] : hearing was intact bilaterally [Cranial Nerves Facial (VII)] : face symmetrical [Cranial Nerves Glossopharyngeal (IX)] : tongue and palate midline [Cranial Nerves Accessory (XI - Cranial And Spinal)] : head turning and shoulder shrug symmetric [Cranial Nerves Hypoglossal (XII)] : there was no tongue deviation with protrusion [No Muscle Atrophy] : normal bulk in all four extremities [Sensation Tactile Decrease] : light touch was intact [0] : Ankle jerk left 0 [Oriented To Time, Place, And Person] : oriented to person, place, and time [Affect] : the affect was normal [Memory Recent] : recent memory was not impaired [PERRL With Normal Accommodation] : pupils were equal in size, round, reactive to light, with normal accommodation [Extraocular Movements] : extraocular movements were intact [Past-pointing] : there was no past-pointing [Tremor] : no tremor present [Plantar Reflex Right Only] : normal on the right [Plantar Reflex Left Only] : normal on the left [FreeTextEntry6] :  bilateral  weakness 4-/ 5 [FreeTextEntry8] :  positive Romberg, trouble with heel /toe /tandem [FreeTextEntry1] : ambulates with cane

## 2024-09-20 NOTE — ASSESSMENT
[FreeTextEntry1] : Syncope-both episodes were secondary to pulmonary embolism - On anticoagulation  Cognitive impairment/dementia-most likely vascular type - I recommend neuropsych testing, but patient declined - Encourage physical activity, daily multivitamin

## 2024-09-20 NOTE — HISTORY OF PRESENT ILLNESS
[FreeTextEntry1] : Ms. GEO CARMICHAEL returns to the office for follow-up and her prior history and physical have been reviewed and she reports no change since last visit.  She initially came in for the evaluation of syncope which was secondary to pulmonary embolism.  She was put on anticoagulation for 6-month, and it was continue after that.  Recently she had another episode of syncope, and she was found to have another episode of pulmonary embolism.  She has been put back on anticoagulation.  She also complained of chronic neck pain which was found to be due to degenerative changes in the cervical spine.  We had recommended physical therapy which she has not done.  In the past she has seen a neurosurgeon the recommended surgery which he declined.  She has seen a neurosurgeon in the past for her neck who recommended surgery that she declined, and is still not willing to consider surgery.  Furthermore she has unsteady gait even though she has not had any falls.  EMG/NCS of the lower extremity show evidence of neuropathy which was thought to be secondary to her diabetes.  Lastly she complained of trouble with memory which was thought to be secondary to vascular type dementia.  Her last MRI of the brain done in 2022 showed moderate white matter disease

## 2024-10-01 ENCOUNTER — APPOINTMENT (OUTPATIENT)
Dept: CARDIOLOGY | Facility: CLINIC | Age: 74
End: 2024-10-01
Payer: MEDICARE

## 2024-10-01 VITALS — HEART RATE: 56 BPM | DIASTOLIC BLOOD PRESSURE: 80 MMHG | RESPIRATION RATE: 18 BRPM | SYSTOLIC BLOOD PRESSURE: 110 MMHG

## 2024-10-01 VITALS — WEIGHT: 140 LBS | BODY MASS INDEX: 25.76 KG/M2 | HEIGHT: 62 IN

## 2024-10-01 DIAGNOSIS — I25.3 ANEURYSM OF HEART: ICD-10-CM

## 2024-10-01 DIAGNOSIS — E78.5 HYPERLIPIDEMIA, UNSPECIFIED: ICD-10-CM

## 2024-10-01 DIAGNOSIS — I73.9 PERIPHERAL VASCULAR DISEASE, UNSPECIFIED: ICD-10-CM

## 2024-10-01 DIAGNOSIS — E11.9 TYPE 2 DIABETES MELLITUS W/OUT COMPLICATIONS: ICD-10-CM

## 2024-10-01 DIAGNOSIS — I34.0 NONRHEUMATIC MITRAL (VALVE) INSUFFICIENCY: ICD-10-CM

## 2024-10-01 DIAGNOSIS — I10 ESSENTIAL (PRIMARY) HYPERTENSION: ICD-10-CM

## 2024-10-01 DIAGNOSIS — I26.99 OTHER PULMONARY EMBOLISM W/OUT ACUTE COR PULMONALE: ICD-10-CM

## 2024-10-01 PROBLEM — R55 SYNCOPE, UNSPECIFIED SYNCOPE TYPE: Status: ACTIVE | Noted: 2024-10-01

## 2024-10-01 PROCEDURE — 93242 EXT ECG>48HR<7D RECORDING: CPT

## 2024-10-01 PROCEDURE — G2211 COMPLEX E/M VISIT ADD ON: CPT

## 2024-10-01 PROCEDURE — 93000 ELECTROCARDIOGRAM COMPLETE: CPT

## 2024-10-01 PROCEDURE — 99214 OFFICE O/P EST MOD 30 MIN: CPT

## 2024-10-01 RX ORDER — APIXABAN 2.5 MG/1
2.5 TABLET, FILM COATED ORAL
Refills: 0 | Status: ACTIVE | COMMUNITY

## 2024-10-04 ENCOUNTER — APPOINTMENT (OUTPATIENT)
Dept: NEUROLOGY | Facility: CLINIC | Age: 74
End: 2024-10-04

## 2024-10-10 DIAGNOSIS — R55 SYNCOPE AND COLLAPSE: ICD-10-CM

## 2024-10-31 ENCOUNTER — APPOINTMENT (OUTPATIENT)
Age: 74
End: 2024-10-31

## 2024-10-31 ENCOUNTER — OUTPATIENT (OUTPATIENT)
Dept: OUTPATIENT SERVICES | Facility: HOSPITAL | Age: 74
LOS: 1 days | End: 2024-10-31
Payer: MEDICARE

## 2024-10-31 ENCOUNTER — LABORATORY RESULT (OUTPATIENT)
Age: 74
End: 2024-10-31

## 2024-10-31 VITALS
BODY MASS INDEX: 25.95 KG/M2 | RESPIRATION RATE: 16 BRPM | HEART RATE: 82 BPM | HEIGHT: 62 IN | DIASTOLIC BLOOD PRESSURE: 74 MMHG | SYSTOLIC BLOOD PRESSURE: 120 MMHG | WEIGHT: 141 LBS | TEMPERATURE: 97.9 F

## 2024-10-31 DIAGNOSIS — D68.9 COAGULATION DEFECT, UNSPECIFIED: ICD-10-CM

## 2024-10-31 DIAGNOSIS — I26.99 OTHER PULMONARY EMBOLISM W/OUT ACUTE COR PULMONALE: ICD-10-CM

## 2024-10-31 PROCEDURE — 86146 BETA-2 GLYCOPROTEIN ANTIBODY: CPT

## 2024-10-31 PROCEDURE — 81240 F2 GENE: CPT

## 2024-10-31 PROCEDURE — 83615 LACTATE (LD) (LDH) ENZYME: CPT

## 2024-10-31 PROCEDURE — 85027 COMPLETE CBC AUTOMATED: CPT

## 2024-10-31 PROCEDURE — 82728 ASSAY OF FERRITIN: CPT

## 2024-10-31 PROCEDURE — 86147 CARDIOLIPIN ANTIBODY EA IG: CPT

## 2024-10-31 PROCEDURE — 84165 PROTEIN E-PHORESIS SERUM: CPT

## 2024-10-31 PROCEDURE — 81270 JAK2 GENE: CPT

## 2024-10-31 PROCEDURE — 83521 IG LIGHT CHAINS FREE EACH: CPT

## 2024-10-31 PROCEDURE — 99204 OFFICE O/P NEW MOD 45 MIN: CPT

## 2024-10-31 PROCEDURE — 86334 IMMUNOFIX E-PHORESIS SERUM: CPT

## 2024-10-31 PROCEDURE — 82784 ASSAY IGA/IGD/IGG/IGM EACH: CPT

## 2024-10-31 PROCEDURE — 84155 ASSAY OF PROTEIN SERUM: CPT

## 2024-10-31 PROCEDURE — 82607 VITAMIN B-12: CPT

## 2024-10-31 PROCEDURE — 80053 COMPREHEN METABOLIC PANEL: CPT

## 2024-10-31 PROCEDURE — 83921 ORGANIC ACID SINGLE QUANT: CPT

## 2024-10-31 PROCEDURE — 85046 RETICYTE/HGB CONCENTRATE: CPT

## 2024-10-31 PROCEDURE — 81241 F5 GENE: CPT

## 2024-10-31 RX ORDER — PANTOPRAZOLE SODIUM 40 MG/10ML
40 INJECTION, POWDER, FOR SOLUTION INTRAVENOUS
Refills: 0 | Status: ACTIVE | COMMUNITY

## 2024-10-31 RX ORDER — GLIMEPIRIDE 1 MG/1
1 TABLET ORAL
Refills: 0 | Status: ACTIVE | COMMUNITY

## 2024-10-31 RX ORDER — ALBUTEROL SULFATE 5 MG/ML
SOLUTION, NON-ORAL INHALATION
Refills: 0 | Status: ACTIVE | COMMUNITY

## 2024-10-31 RX ORDER — CLOPIDOGREL 75 MG/1
75 TABLET, FILM COATED ORAL
Refills: 0 | Status: ACTIVE | COMMUNITY

## 2024-10-31 NOTE — BEGINNING OF VISIT
[0] : 2) Feeling down, depressed, or hopeless: Not at all (0) [PHQ-2 Negative] : PHQ-2 Negative [Future Reassessment of Pain Scale] : Future reassessment of pain scale [Never] : Never [Patient/Caregiver unclear of wishes] : Patient/Caregiver unclear of wishes

## 2024-11-01 ENCOUNTER — NON-APPOINTMENT (OUTPATIENT)
Age: 74
End: 2024-11-01

## 2024-11-01 DIAGNOSIS — D68.9 COAGULATION DEFECT, UNSPECIFIED: ICD-10-CM

## 2024-11-01 LAB
ALBUMIN SERPL ELPH-MCNC: 4.3 G/DL
ALP BLD-CCNC: 98 U/L
ALT SERPL-CCNC: 11 U/L
ANION GAP SERPL CALC-SCNC: 10 MMOL/L
AST SERPL-CCNC: 18 U/L
BILIRUB SERPL-MCNC: 0.2 MG/DL
BUN SERPL-MCNC: 16 MG/DL
CALCIUM SERPL-MCNC: 9.7 MG/DL
CHLORIDE SERPL-SCNC: 107 MMOL/L
CO2 SERPL-SCNC: 24 MMOL/L
CREAT SERPL-MCNC: 1.2 MG/DL
EGFR: 47 ML/MIN/1.73M2
FERRITIN SERPL-MCNC: 38 NG/ML
GLUCOSE SERPL-MCNC: 111 MG/DL
HCT VFR BLD CALC: 34.3 %
HGB BLD-MCNC: 11.5 G/DL
LDH SERPL-CCNC: 310
MCHC RBC-ENTMCNC: 28.2 PG
MCHC RBC-ENTMCNC: 33.5 G/DL
MCV RBC AUTO: 84.1 FL
PLATELET # BLD AUTO: 173 K/UL
PMV BLD: 10.7 FL
POTASSIUM SERPL-SCNC: 4.4 MMOL/L
PROT SERPL-MCNC: 8.6 G/DL
RBC # BLD: 4.08 M/UL
RBC # FLD: 13.2 %
RETICS # AUTO: 1.2 %
RETICS AGGREG/RBC NFR: 48 K/UL
SODIUM SERPL-SCNC: 141 MMOL/L
VIT B12 SERPL-MCNC: 320 PG/ML
WBC # FLD AUTO: 6.35 K/UL

## 2024-11-04 LAB
B2 GLYCOPROT1 IGG SER-ACNC: <1.4 U/ML
B2 GLYCOPROT1 IGM SER-ACNC: <1.5 U/ML
CARDIOLIPIN IGM SER-MCNC: <1.5 MPL U/ML
CARDIOLIPIN IGM SER-MCNC: <1.6 GPL U/ML
DEPRECATED CARDIOLIPIN IGA SER: <2 APL U/ML
DNA PLOIDY SPEC FC-IMP: NORMAL
PTR INTERP: NORMAL

## 2024-11-05 LAB
ALBUMIN MFR SERPL ELPH: 51.3 %
ALBUMIN SERPL-MCNC: 4.4 G/DL
ALBUMIN/GLOB SERPL: 1.1 RATIO
ALPHA1 GLOB MFR SERPL ELPH: 3.2 %
ALPHA1 GLOB SERPL ELPH-MCNC: 0.3 G/DL
ALPHA2 GLOB MFR SERPL ELPH: 8 %
ALPHA2 GLOB SERPL ELPH-MCNC: 0.7 G/DL
B-GLOBULIN MFR SERPL ELPH: 11.3 %
B-GLOBULIN SERPL ELPH-MCNC: 1 G/DL
DEPRECATED KAPPA LC FREE/LAMBDA SER: 1.95 RATIO
GAMMA GLOB FLD ELPH-MCNC: 2.2 G/DL
GAMMA GLOB MFR SERPL ELPH: 26.2 %
IGA SER QL IEP: 414 MG/DL
IGG SER QL IEP: 2451 MG/DL
IGM SER QL IEP: 65 MG/DL
INTERPRETATION SERPL IEP-IMP: NORMAL
KAPPA LC CSF-MCNC: 3.01 MG/DL
KAPPA LC SERPL-MCNC: 5.86 MG/DL
M PROTEIN SPEC IFE-MCNC: NORMAL
METHYLMALONATE SERPL-SCNC: 126 NMOL/L
PROT SERPL-MCNC: 8.5 G/DL
PROT SERPL-MCNC: 8.5 G/DL

## 2024-11-06 NOTE — HISTORY OF PRESENT ILLNESS
[de-identified] : 74 year old female with multiple medical problems including peripheral neuropathy , ASD , PAD , diabetes, hearing loss , syncope , referred for recurrent PE , first episode in 2022 in context of COvid 19 , In July 2024 she was admitted to [facility] for another episode , started on eliquis , no documented DVT , Her breathing improved except for mild exertional dyspnea relieved with inhaled bronchodilators . She denies trauma , recent illness , long travel . She reports decreased activity for past year due to progressive neuropathy , uses a cane to ambulate , No prior pregnancy or family history of thrombophilia She denies weight loss, last colonoscopy > 5 years ago , up to date with mammogram ,

## 2024-11-06 NOTE — PHYSICAL EXAM
[Normal] : full range of motion and no deformities appreciated [de-identified] : gait slow , slighttly unsteady . negative Rhomberg.

## 2024-11-06 NOTE — HISTORY OF PRESENT ILLNESS
[de-identified] : 74 year old female with multiple medical problems including peripheral neuropathy , ASD , PAD , diabetes, hearing loss , syncope , referred for recurrent PE , first episode in 2022 in context of COvid 19 , In July 2024 she was admitted to [facility] for another episode , started on eliquis , no documented DVT , Her breathing improved except for mild exertional dyspnea relieved with inhaled bronchodilators . She denies trauma , recent illness , long travel . She reports decreased activity for past year due to progressive neuropathy , uses a cane to ambulate , No prior pregnancy or family history of thrombophilia She denies weight loss, last colonoscopy > 5 years ago , up to date with mammogram ,

## 2024-11-06 NOTE — HISTORY OF PRESENT ILLNESS
[de-identified] : 74 year old female with multiple medical problems including peripheral neuropathy , ASD , PAD , diabetes, hearing loss , syncope , referred for recurrent PE , first episode in 2022 in context of COvid 19 , In July 2024 she was admitted to [facility] for another episode , started on eliquis , no documented DVT , Her breathing improved except for mild exertional dyspnea relieved with inhaled bronchodilators . She denies trauma , recent illness , long travel . She reports decreased activity for past year due to progressive neuropathy , uses a cane to ambulate , No prior pregnancy or family history of thrombophilia She denies weight loss, last colonoscopy > 5 years ago , up to date with mammogram ,

## 2024-11-06 NOTE — PHYSICAL EXAM
[Normal] : full range of motion and no deformities appreciated [de-identified] : gait slow , slighttly unsteady . negative Rhomberg.

## 2024-11-06 NOTE — PHYSICAL EXAM
[Normal] : full range of motion and no deformities appreciated [de-identified] : gait slow , slighttly unsteady . negative Rhomberg.

## 2024-11-06 NOTE — ASSESSMENT
[FreeTextEntry1] : 74 year old female with progressive peripheral neuropathy ,  Recurrent episodes of PE in the background of Covid 19 ( first episode )  limited mobility  Plan : recommend indefinite anticoagulation ( changed to lower dose eliquis by cardiology )           check myeloma panel , B12 , Prothrombin gene mutation , factor 5 Leiden , APL antibodies           continue with age appropriate cancer screening

## 2024-11-07 LAB
JAK2 P.V617F BLD/T QL: NORMAL
REFLEX:: NORMAL

## 2024-12-16 ENCOUNTER — APPOINTMENT (OUTPATIENT)
Age: 74
End: 2024-12-16
Payer: MEDICARE

## 2024-12-16 ENCOUNTER — OUTPATIENT (OUTPATIENT)
Dept: OUTPATIENT SERVICES | Facility: HOSPITAL | Age: 74
LOS: 1 days | End: 2024-12-16
Payer: MEDICARE

## 2024-12-16 ENCOUNTER — LABORATORY RESULT (OUTPATIENT)
Age: 74
End: 2024-12-16

## 2024-12-16 VITALS
TEMPERATURE: 97.9 F | SYSTOLIC BLOOD PRESSURE: 134 MMHG | WEIGHT: 140 LBS | BODY MASS INDEX: 25.76 KG/M2 | HEART RATE: 60 BPM | HEIGHT: 62 IN | DIASTOLIC BLOOD PRESSURE: 80 MMHG | OXYGEN SATURATION: 99 %

## 2024-12-16 DIAGNOSIS — I26.99 OTHER PULMONARY EMBOLISM W/OUT ACUTE COR PULMONALE: ICD-10-CM

## 2024-12-16 LAB
HCT VFR BLD CALC: 34.8 %
HGB BLD-MCNC: 11.5 G/DL
MCHC RBC-ENTMCNC: 28.3 PG
MCHC RBC-ENTMCNC: 33 G/DL
MCV RBC AUTO: 85.7 FL
PLATELET # BLD AUTO: 175 K/UL
PMV BLD: 10.7 FL
RBC # BLD: 4.06 M/UL
RBC # FLD: 13.2 %
WBC # FLD AUTO: 7.12 K/UL

## 2024-12-16 PROCEDURE — 82728 ASSAY OF FERRITIN: CPT

## 2024-12-16 PROCEDURE — 85027 COMPLETE CBC AUTOMATED: CPT

## 2024-12-16 PROCEDURE — 99214 OFFICE O/P EST MOD 30 MIN: CPT

## 2024-12-16 NOTE — PHYSICAL EXAM
[Normal] : normoactive bowel sounds, soft and nontender, no hepatosplenomegaly or masses appreciated [de-identified] : gait slow , slighttly unsteady . negative Rhomberg.

## 2024-12-16 NOTE — HISTORY OF PRESENT ILLNESS
[de-identified] : 74 year old female with multiple medical problems including peripheral neuropathy , ASD , PAD , diabetes, hearing loss , syncope , referred for recurrent PE , first episode in 2022 in context of COvid 19 , In July 2024 she was admitted to [facility] for another episode , started on eliquis , no documented DVT , Her breathing improved except for mild exertional dyspnea relieved with inhaled bronchodilators . She denies trauma , recent illness , long travel . She reports decreased activity for past year due to progressive neuropathy , uses a cane to ambulate , No prior pregnancy or family history of thrombophilia She denies weight loss, last colonoscopy > 5 years ago , up to date with mammogram ,  [de-identified] : 12/16/2024 Patient returns for follow up , she is back on eliquis 5 mg bid , she is no longer on Plavix . She complains only of mild fatigue . Last blood work was unremarkable except for low normal ferritin and elevated LDH , she had last colonoscopy > 5 years ago

## 2024-12-16 NOTE — ASSESSMENT
[FreeTextEntry1] : 74 year old female with progressive peripheral neuropathy ,  Recurrent episodes of PE in the background of Covid 19 ( first episode )  limited mobility  mild anemia , low ferritin   Plan : continue eliquis             repeat ferritin , LDH , treat iron deficiency            GI work up ?

## 2024-12-17 DIAGNOSIS — D69.9 HEMORRHAGIC CONDITION, UNSPECIFIED: ICD-10-CM

## 2024-12-17 DIAGNOSIS — D50.9 IRON DEFICIENCY ANEMIA, UNSPECIFIED: ICD-10-CM

## 2024-12-17 LAB — FERRITIN SERPL-MCNC: 36 NG/ML

## 2024-12-17 RX ORDER — FERROUS FUMARATE 324(106)MG
324 TABLET ORAL
Qty: 60 | Refills: 0 | Status: ACTIVE | COMMUNITY
Start: 2024-12-17 | End: 1900-01-01

## 2024-12-25 PROBLEM — F10.90 ALCOHOL USE: Status: ACTIVE | Noted: 2017-10-06

## 2024-12-30 ENCOUNTER — NON-APPOINTMENT (OUTPATIENT)
Age: 74
End: 2024-12-30

## 2025-01-27 ENCOUNTER — RX RENEWAL (OUTPATIENT)
Age: 75
End: 2025-01-27

## 2025-01-27 RX ORDER — FERROUS FUMARATE 324(106)MG
324 TABLET ORAL
Qty: 60 | Refills: 0 | Status: ACTIVE | COMMUNITY
Start: 2025-01-27 | End: 1900-01-01

## 2025-01-28 RX ORDER — APIXABAN 5 MG/1
5 TABLET, FILM COATED ORAL
Qty: 60 | Refills: 1 | Status: ACTIVE | COMMUNITY
Start: 2025-01-28 | End: 1900-01-01

## 2025-02-24 ENCOUNTER — APPOINTMENT (OUTPATIENT)
Age: 75
End: 2025-02-24

## 2025-02-24 ENCOUNTER — OUTPATIENT (OUTPATIENT)
Dept: OUTPATIENT SERVICES | Facility: HOSPITAL | Age: 75
LOS: 1 days | End: 2025-02-24
Payer: MEDICARE

## 2025-02-24 DIAGNOSIS — D69.9 HEMORRHAGIC CONDITION, UNSPECIFIED: ICD-10-CM

## 2025-02-24 LAB
AUTO BASOPHILS #: 0.05 K/UL
AUTO BASOPHILS %: 0.9 %
AUTO EOSINOPHILS #: 0.15 K/UL
AUTO EOSINOPHILS %: 2.6 %
AUTO IMMATURE GRANULOCYTES #: 0.02 K/UL
AUTO LYMPHOCYTES #: 1.36 K/UL
AUTO LYMPHOCYTES %: 23.5 %
AUTO MONOCYTES #: 0.39 K/UL
AUTO MONOCYTES %: 6.7 %
AUTO NEUTROPHILS #: 3.82 K/UL
AUTO NEUTROPHILS %: 66 %
AUTO NRBC #: 0 K/UL
HCT VFR BLD CALC: 34 %
HGB BLD-MCNC: 11.4 G/DL
IMM GRANULOCYTES NFR BLD AUTO: 0.3 %
MAN DIFF?: NORMAL
MCHC RBC-ENTMCNC: 28.8 PG
MCHC RBC-ENTMCNC: 33.5 G/DL
MCV RBC AUTO: 85.9 FL
PLATELET # BLD AUTO: 178 K/UL
PMV BLD AUTO: 0 /100 WBCS
PMV BLD: 10.4 FL
RBC # BLD: 3.96 M/UL
RBC # FLD: 13.1 %
WBC # FLD AUTO: 5.79 K/UL

## 2025-02-24 PROCEDURE — 82728 ASSAY OF FERRITIN: CPT

## 2025-02-24 PROCEDURE — 85025 COMPLETE CBC W/AUTO DIFF WBC: CPT

## 2025-02-24 PROCEDURE — 36415 COLL VENOUS BLD VENIPUNCTURE: CPT

## 2025-02-25 DIAGNOSIS — D69.9 HEMORRHAGIC CONDITION, UNSPECIFIED: ICD-10-CM

## 2025-02-26 LAB — FERRITIN SERPL-MCNC: 45 NG/ML

## 2025-03-07 ENCOUNTER — APPOINTMENT (OUTPATIENT)
Age: 75
End: 2025-03-07

## 2025-03-07 ENCOUNTER — OUTPATIENT (OUTPATIENT)
Dept: OUTPATIENT SERVICES | Facility: HOSPITAL | Age: 75
LOS: 1 days | End: 2025-03-07
Payer: MEDICARE

## 2025-03-07 VITALS
BODY MASS INDEX: 25.58 KG/M2 | SYSTOLIC BLOOD PRESSURE: 150 MMHG | TEMPERATURE: 98.1 F | HEIGHT: 62 IN | OXYGEN SATURATION: 98 % | DIASTOLIC BLOOD PRESSURE: 84 MMHG | HEART RATE: 83 BPM | RESPIRATION RATE: 16 BRPM | WEIGHT: 139 LBS

## 2025-03-07 DIAGNOSIS — I26.99 OTHER PULMONARY EMBOLISM WITHOUT ACUTE COR PULMONALE: ICD-10-CM

## 2025-03-07 PROCEDURE — 99213 OFFICE O/P EST LOW 20 MIN: CPT

## 2025-03-07 NOTE — PHYSICAL EXAM
[Normal] : normoactive bowel sounds, soft and nontender, no hepatosplenomegaly or masses appreciated [de-identified] : alert , slightly sluggish and slow mentation , decreased memory  [de-identified] : gait slow , slighttly unsteady . negative Rhomberg.

## 2025-03-07 NOTE — ASSESSMENT
[FreeTextEntry1] : 74 year old female with progressive peripheral neuropathy , Vascular dementia  Recurrent episodes of PE in the background of Covid 19 ( first episode )  limited mobility  mild anemia , low ferritin , on Po iron   blood work reviewed , Hgb and ferritin are stable  Plan : continue eliquis , po iron             follow up in 6 months with cbc , ferritin

## 2025-03-07 NOTE — HISTORY OF PRESENT ILLNESS
[de-identified] : 12/16/2024 Patient returns for follow up , she is back on eliquis 5 mg bid , she is no longer on Plavix . She complains only of mild fatigue . Last blood work was unremarkable except for low normal ferritin and elevated LDH , she had last colonoscopy > 5 years ago   3/7/2025 Patient returns for history of VTE on reduced dose eliquis . She denies any change in breathing , leg pain or swelling. She notes however slightly increased weakness in LLE and gait imbalance , she is still capable of all self care at home , uses a cane when outside. [de-identified] : 74 year old female with multiple medical problems including peripheral neuropathy , ASD , PAD , diabetes, hearing loss , syncope , referred for recurrent PE , first episode in 2022 in context of COvid 19 , In July 2024 she was admitted to [facility] for another episode , started on eliquis , no documented DVT , Her breathing improved except for mild exertional dyspnea relieved with inhaled bronchodilators . She denies trauma , recent illness , long travel . She reports decreased activity for past year due to progressive neuropathy , uses a cane to ambulate , No prior pregnancy or family history of thrombophilia She denies weight loss, last colonoscopy > 5 years ago , up to date with mammogram ,

## 2025-03-08 DIAGNOSIS — I26.99 OTHER PULMONARY EMBOLISM WITHOUT ACUTE COR PULMONALE: ICD-10-CM

## 2025-03-12 PROBLEM — Z86.39 HISTORY OF HIGH CHOLESTEROL: Status: RESOLVED | Noted: 2025-03-12 | Resolved: 2025-03-12

## 2025-03-12 PROBLEM — Z86.718 HISTORY OF BLOOD CLOTS: Status: RESOLVED | Noted: 2025-03-12 | Resolved: 2025-03-12

## 2025-03-12 PROBLEM — Z86.39 HISTORY OF DIABETES MELLITUS: Status: RESOLVED | Noted: 2025-03-12 | Resolved: 2025-03-12

## 2025-03-12 PROBLEM — Z12.11 ENCOUNTER FOR SCREENING COLONOSCOPY: Status: ACTIVE | Noted: 2025-03-12 | Resolved: 2025-03-26

## 2025-03-12 PROBLEM — Z86.79 HISTORY OF HYPERTENSION: Status: RESOLVED | Noted: 2025-03-12 | Resolved: 2025-03-12

## 2025-03-12 PROBLEM — Z86.39 HISTORY OF HYPERTHYROIDISM: Status: RESOLVED | Noted: 2025-03-12 | Resolved: 2025-03-12

## 2025-03-18 ENCOUNTER — NON-APPOINTMENT (OUTPATIENT)
Age: 75
End: 2025-03-18

## 2025-03-18 ENCOUNTER — APPOINTMENT (OUTPATIENT)
Dept: GASTROENTEROLOGY | Facility: CLINIC | Age: 75
End: 2025-03-18
Payer: MEDICARE

## 2025-03-18 ENCOUNTER — APPOINTMENT (OUTPATIENT)
Dept: NEUROLOGY | Facility: CLINIC | Age: 75
End: 2025-03-18

## 2025-03-18 VITALS — WEIGHT: 140 LBS | BODY MASS INDEX: 25.76 KG/M2 | HEIGHT: 62 IN

## 2025-03-18 DIAGNOSIS — D50.9 IRON DEFICIENCY ANEMIA, UNSPECIFIED: ICD-10-CM

## 2025-03-18 DIAGNOSIS — Z86.718 PERSONAL HISTORY OF OTHER VENOUS THROMBOSIS AND EMBOLISM: ICD-10-CM

## 2025-03-18 DIAGNOSIS — Z12.11 ENCOUNTER FOR SCREENING FOR MALIGNANT NEOPLASM OF COLON: ICD-10-CM

## 2025-03-18 DIAGNOSIS — Z86.39 PERSONAL HISTORY OF OTHER ENDOCRINE, NUTRITIONAL AND METABOLIC DISEASE: ICD-10-CM

## 2025-03-18 DIAGNOSIS — R13.10 DYSPHAGIA, UNSPECIFIED: ICD-10-CM

## 2025-03-18 DIAGNOSIS — Z86.79 PERSONAL HISTORY OF OTHER DISEASES OF THE CIRCULATORY SYSTEM: ICD-10-CM

## 2025-03-18 PROCEDURE — 99204 OFFICE O/P NEW MOD 45 MIN: CPT

## 2025-03-18 RX ORDER — GLIMEPIRIDE 4 MG/1
4 TABLET ORAL
Refills: 0 | Status: ACTIVE | COMMUNITY

## 2025-03-18 RX ORDER — METFORMIN HYDROCHLORIDE 500 MG/1
500 TABLET, COATED ORAL
Refills: 0 | Status: ACTIVE | COMMUNITY

## 2025-03-18 RX ORDER — PEG-3350, SODIUM SULFATE, SODIUM CHLORIDE, POTASSIUM CHLORIDE, SODIUM ASCORBATE AND ASCORBIC ACID 7.5-2.691G
100 KIT ORAL
Qty: 1 | Refills: 0 | Status: ACTIVE | COMMUNITY
Start: 2025-03-18 | End: 1900-01-01

## 2025-03-18 NOTE — PHYSICAL EXAM

## 2025-04-04 LAB
ALBUMIN SERPL ELPH-MCNC: 4.3 G/DL
ALP BLD-CCNC: 92 U/L
ALT SERPL-CCNC: 11 U/L
ANION GAP SERPL CALC-SCNC: 16 MMOL/L
AST SERPL-CCNC: 20 U/L
BILIRUB SERPL-MCNC: 0.4 MG/DL
BUN SERPL-MCNC: 16 MG/DL
CALCIUM SERPL-MCNC: 9.7 MG/DL
CHLORIDE SERPL-SCNC: 106 MMOL/L
CHOLEST SERPL-MCNC: 211 MG/DL
CO2 SERPL-SCNC: 20 MMOL/L
CREAT SERPL-MCNC: 1.2 MG/DL
EGFRCR SERPLBLD CKD-EPI 2021: 47 ML/MIN/1.73M2
ESTIMATED AVERAGE GLUCOSE: 163 MG/DL
GLUCOSE SERPL-MCNC: 140 MG/DL
HBA1C MFR BLD HPLC: 7.3 %
HDLC SERPL-MCNC: 75 MG/DL
LDLC SERPL-MCNC: 126 MG/DL
NONHDLC SERPL-MCNC: 136 MG/DL
POTASSIUM SERPL-SCNC: 4.8 MMOL/L
PROT SERPL-MCNC: 8.9 G/DL
SODIUM SERPL-SCNC: 142 MMOL/L
TRIGL SERPL-MCNC: 58 MG/DL

## 2025-04-14 ENCOUNTER — APPOINTMENT (OUTPATIENT)
Dept: CARDIOLOGY | Facility: CLINIC | Age: 75
End: 2025-04-14
Payer: MEDICARE

## 2025-04-14 VITALS — DIASTOLIC BLOOD PRESSURE: 80 MMHG | SYSTOLIC BLOOD PRESSURE: 118 MMHG | RESPIRATION RATE: 18 BRPM | HEART RATE: 52 BPM

## 2025-04-14 VITALS — BODY MASS INDEX: 25.95 KG/M2 | HEIGHT: 62 IN | WEIGHT: 141 LBS

## 2025-04-14 DIAGNOSIS — I73.9 PERIPHERAL VASCULAR DISEASE, UNSPECIFIED: ICD-10-CM

## 2025-04-14 DIAGNOSIS — I34.0 NONRHEUMATIC MITRAL (VALVE) INSUFFICIENCY: ICD-10-CM

## 2025-04-14 DIAGNOSIS — E78.5 HYPERLIPIDEMIA, UNSPECIFIED: ICD-10-CM

## 2025-04-14 DIAGNOSIS — I10 ESSENTIAL (PRIMARY) HYPERTENSION: ICD-10-CM

## 2025-04-14 DIAGNOSIS — I26.99 OTHER PULMONARY EMBOLISM W/OUT ACUTE COR PULMONALE: ICD-10-CM

## 2025-04-14 DIAGNOSIS — R55 SYNCOPE AND COLLAPSE: ICD-10-CM

## 2025-04-14 DIAGNOSIS — I25.3 ANEURYSM OF HEART: ICD-10-CM

## 2025-04-14 DIAGNOSIS — E11.9 TYPE 2 DIABETES MELLITUS W/OUT COMPLICATIONS: ICD-10-CM

## 2025-04-14 PROCEDURE — 99214 OFFICE O/P EST MOD 30 MIN: CPT

## 2025-04-14 PROCEDURE — G2211 COMPLEX E/M VISIT ADD ON: CPT

## 2025-04-14 PROCEDURE — 93000 ELECTROCARDIOGRAM COMPLETE: CPT

## 2025-04-14 NOTE — PHYSICAL EXAM
[General Appearance - Well Developed] : well developed [Normal Appearance] : normal appearance [Well Groomed] : well groomed [General Appearance - Well Nourished] : well nourished [No Deformities] : no deformities [General Appearance - In No Acute Distress] : no acute distress [Normal Conjunctiva] : the conjunctiva exhibited no abnormalities [Eyelids - No Xanthelasma] : the eyelids demonstrated no xanthelasmas [Normal Oral Mucosa] : normal oral mucosa [No Oral Pallor] : no oral pallor [No Oral Cyanosis] : no oral cyanosis [Normal Jugular Venous A Waves Present] : normal jugular venous A waves present [Normal Jugular Venous V Waves Present] : normal jugular venous V waves present [No Jugular Venous Mclean A Waves] : no jugular venous mclean A waves [Heart Rate And Rhythm] : heart rate and rhythm were normal [Heart Sounds] : normal S1 and S2 [Murmurs] : no murmurs present [Respiration, Rhythm And Depth] : normal respiratory rhythm and effort [Exaggerated Use Of Accessory Muscles For Inspiration] : no accessory muscle use [Auscultation Breath Sounds / Voice Sounds] : lungs were clear to auscultation bilaterally [Bowel Sounds] : normal bowel sounds [Abdomen Soft] : soft [Abdomen Tenderness] : non-tender [Abdomen Mass (___ Cm)] : no abdominal mass palpated [Abnormal Walk] : normal gait [Gait - Sufficient For Exercise Testing] : the gait was sufficient for exercise testing [Nail Clubbing] : no clubbing of the fingernails [Cyanosis, Localized] : no localized cyanosis [Petechial Hemorrhages (___cm)] : no petechial hemorrhages [Skin Color & Pigmentation] : normal skin color and pigmentation [] : no rash [No Venous Stasis] : no venous stasis [Skin Lesions] : no skin lesions [No Skin Ulcers] : no skin ulcer [No Xanthoma] : no  xanthoma was observed [Oriented To Time, Place, And Person] : oriented to person, place, and time

## 2025-07-01 RX ORDER — SODIUM SULFATE, POTASSIUM SULFATE AND MAGNESIUM SULFATE 1.6; 3.13; 17.5 G/177ML; G/177ML; G/177ML
17.5-3.13-1.6 SOLUTION ORAL
Qty: 1 | Refills: 0 | Status: ACTIVE | COMMUNITY
Start: 2025-07-01 | End: 1900-01-01

## 2025-07-18 ENCOUNTER — NON-APPOINTMENT (OUTPATIENT)
Age: 75
End: 2025-07-18

## 2025-07-21 ENCOUNTER — RESULT REVIEW (OUTPATIENT)
Age: 75
End: 2025-07-21

## 2025-07-21 ENCOUNTER — NON-APPOINTMENT (OUTPATIENT)
Age: 75
End: 2025-07-21

## 2025-07-21 ENCOUNTER — OUTPATIENT (OUTPATIENT)
Dept: OUTPATIENT SERVICES | Facility: HOSPITAL | Age: 75
LOS: 1 days | Discharge: HOME | End: 2025-07-21
Payer: MEDICARE

## 2025-07-21 ENCOUNTER — TRANSCRIPTION ENCOUNTER (OUTPATIENT)
Age: 75
End: 2025-07-21

## 2025-07-21 VITALS
WEIGHT: 139.99 LBS | OXYGEN SATURATION: 96 % | HEIGHT: 62 IN | HEART RATE: 64 BPM | DIASTOLIC BLOOD PRESSURE: 79 MMHG | TEMPERATURE: 98 F | SYSTOLIC BLOOD PRESSURE: 144 MMHG | RESPIRATION RATE: 18 BRPM

## 2025-07-21 VITALS — HEART RATE: 51 BPM | SYSTOLIC BLOOD PRESSURE: 166 MMHG | RESPIRATION RATE: 16 BRPM | DIASTOLIC BLOOD PRESSURE: 74 MMHG

## 2025-07-21 DIAGNOSIS — R13.10 DYSPHAGIA, UNSPECIFIED: ICD-10-CM

## 2025-07-21 DIAGNOSIS — D50.9 IRON DEFICIENCY ANEMIA, UNSPECIFIED: ICD-10-CM

## 2025-07-21 LAB — GLUCOSE BLDC GLUCOMTR-MCNC: 147 MG/DL — HIGH (ref 70–99)

## 2025-07-21 PROCEDURE — 88305 TISSUE EXAM BY PATHOLOGIST: CPT

## 2025-07-21 PROCEDURE — 88312 SPECIAL STAINS GROUP 1: CPT

## 2025-07-21 PROCEDURE — 88312 SPECIAL STAINS GROUP 1: CPT | Mod: 26

## 2025-07-21 PROCEDURE — 43239 EGD BIOPSY SINGLE/MULTIPLE: CPT | Mod: XS

## 2025-07-21 PROCEDURE — 45380 COLONOSCOPY AND BIOPSY: CPT

## 2025-07-21 PROCEDURE — 88305 TISSUE EXAM BY PATHOLOGIST: CPT | Mod: 26

## 2025-07-21 PROCEDURE — 82962 GLUCOSE BLOOD TEST: CPT

## 2025-07-21 RX ORDER — METOPROLOL SUCCINATE 50 MG/1
1 TABLET, EXTENDED RELEASE ORAL
Refills: 0 | DISCHARGE

## 2025-07-21 RX ORDER — METOCLOPRAMIDE HCL 10 MG
1 TABLET ORAL
Refills: 0 | DISCHARGE

## 2025-07-21 RX ORDER — APIXABAN 5 MG/1
1 TABLET, FILM COATED ORAL
Refills: 0 | DISCHARGE

## 2025-07-21 RX ORDER — ONDANSETRON HCL/PF 4 MG/2 ML
4 VIAL (ML) INJECTION ONCE
Refills: 0 | Status: DISCONTINUED | OUTPATIENT
Start: 2025-07-21 | End: 2025-07-21

## 2025-07-21 RX ORDER — SODIUM CHLORIDE 9 G/1000ML
1000 INJECTION, SOLUTION INTRAVENOUS
Refills: 0 | Status: DISCONTINUED | OUTPATIENT
Start: 2025-07-21 | End: 2025-07-21

## 2025-07-21 RX ORDER — METFORMIN HYDROCHLORIDE 850 MG/1
1 TABLET ORAL
Refills: 0 | DISCHARGE

## 2025-07-21 RX ORDER — AMLODIPINE BESYLATE 10 MG/1
1 TABLET ORAL
Refills: 0 | DISCHARGE

## 2025-07-21 NOTE — ASU DISCHARGE PLAN (ADULT/PEDIATRIC) - CARE PROVIDER_API CALL
Mary Downing ()  Gastroenterology  [address] [place] [address]  Phone: [phone]  Fax: [phone]  Follow Up Time:

## 2025-07-21 NOTE — ASU PREOP CHECKLIST - HAND OFF
Quality 110: Preventive Care And Screening: Influenza Immunization: Influenza Immunization Administered during Influenza season Quality 474: Zoster Vaccination Status: Shingrix Vaccination not Administered or Previously Received, Reason not Otherwise Specified Detail Level: Detailed Unit RN to OR RN

## 2025-07-21 NOTE — ASU DISCHARGE PLAN (ADULT/PEDIATRIC) - FINANCIAL ASSISTANCE
[facility] provides services at a reduced cost to those who are determined to be eligible through [facility]’s financial assistance program. Information regarding [facility]’s financial assistance program can be found by going to [email or web address] or by calling [phone].

## 2025-07-21 NOTE — PRE-ANESTHESIA EVALUATION ADULT - NSANTHOSAYNRD_GEN_A_CORE
denies/No. FITO screening performed.  STOP BANG Legend: 0-2 = LOW Risk; 3-4 = INTERMEDIATE Risk; 5-8 = HIGH Risk

## 2025-07-21 NOTE — H&P PST ADULT - HISTORY OF PRESENT ILLNESS
74 yo for EGD/Colonoscopy  Tranexamic Acid Counseling:  Patient advised of the small risk of bleeding problems with tranexamic acid. They were also instructed to call if they developed any nausea, vomiting or diarrhea. All of the patient's questions and concerns were addressed.

## 2025-07-21 NOTE — ASU PATIENT PROFILE, ADULT - NSICDXPASTMEDICALHX_GEN_ALL_CORE_FT
PAST MEDICAL HISTORY:  Diabetes type 2      PAST MEDICAL HISTORY:  CVA (cerebrovascular accident)     Diabetes type 2     Hypertension     Pulmonary emboli

## 2025-07-22 LAB
SURGICAL PATHOLOGY STUDY: SIGNIFICANT CHANGE UP
SURGICAL PATHOLOGY STUDY: SIGNIFICANT CHANGE UP

## 2025-07-24 DIAGNOSIS — Z86.711 PERSONAL HISTORY OF PULMONARY EMBOLISM: ICD-10-CM

## 2025-07-24 DIAGNOSIS — K29.80 DUODENITIS WITHOUT BLEEDING: ICD-10-CM

## 2025-07-24 DIAGNOSIS — K63.5 POLYP OF COLON: ICD-10-CM

## 2025-07-24 DIAGNOSIS — Z79.84 LONG TERM (CURRENT) USE OF ORAL HYPOGLYCEMIC DRUGS: ICD-10-CM

## 2025-07-24 DIAGNOSIS — K64.4 RESIDUAL HEMORRHOIDAL SKIN TAGS: ICD-10-CM

## 2025-07-24 DIAGNOSIS — K31.A19 GASTRIC INTESTINAL METAPLASIA WITHOUT DYSPLASIA, UNSPECIFIED SITE: ICD-10-CM

## 2025-07-24 DIAGNOSIS — Z12.11 ENCOUNTER FOR SCREENING FOR MALIGNANT NEOPLASM OF COLON: ICD-10-CM

## 2025-07-24 DIAGNOSIS — I10 ESSENTIAL (PRIMARY) HYPERTENSION: ICD-10-CM

## 2025-07-24 DIAGNOSIS — K52.9 NONINFECTIVE GASTROENTERITIS AND COLITIS, UNSPECIFIED: ICD-10-CM

## 2025-07-24 DIAGNOSIS — D50.9 IRON DEFICIENCY ANEMIA, UNSPECIFIED: ICD-10-CM

## 2025-07-24 DIAGNOSIS — K29.50 UNSPECIFIED CHRONIC GASTRITIS WITHOUT BLEEDING: ICD-10-CM

## 2025-07-24 DIAGNOSIS — E11.9 TYPE 2 DIABETES MELLITUS WITHOUT COMPLICATIONS: ICD-10-CM

## 2025-07-24 DIAGNOSIS — K64.8 OTHER HEMORRHOIDS: ICD-10-CM

## 2025-07-24 DIAGNOSIS — Z79.01 LONG TERM (CURRENT) USE OF ANTICOAGULANTS: ICD-10-CM

## 2025-07-24 DIAGNOSIS — Z86.73 PERSONAL HISTORY OF TRANSIENT ISCHEMIC ATTACK (TIA), AND CEREBRAL INFARCTION WITHOUT RESIDUAL DEFICITS: ICD-10-CM

## 2025-07-24 DIAGNOSIS — K20.90 ESOPHAGITIS, UNSPECIFIED WITHOUT BLEEDING: ICD-10-CM

## 2025-07-25 ENCOUNTER — RX RENEWAL (OUTPATIENT)
Age: 75
End: 2025-07-25

## 2025-07-25 PROBLEM — E11.9 TYPE 2 DIABETES MELLITUS WITHOUT COMPLICATIONS: Chronic | Status: ACTIVE | Noted: 2025-07-21

## 2025-07-25 PROBLEM — I10 ESSENTIAL (PRIMARY) HYPERTENSION: Chronic | Status: ACTIVE | Noted: 2025-07-21

## 2025-07-25 PROBLEM — I63.9 CEREBRAL INFARCTION, UNSPECIFIED: Chronic | Status: ACTIVE | Noted: 2025-07-21

## 2025-07-25 PROBLEM — I26.99 OTHER PULMONARY EMBOLISM WITHOUT ACUTE COR PULMONALE: Chronic | Status: ACTIVE | Noted: 2025-07-21

## 2025-08-05 ENCOUNTER — APPOINTMENT (OUTPATIENT)
Dept: GASTROENTEROLOGY | Facility: CLINIC | Age: 75
End: 2025-08-05
Payer: MEDICARE

## 2025-08-05 VITALS — BODY MASS INDEX: 24.66 KG/M2 | WEIGHT: 134 LBS | HEIGHT: 62 IN

## 2025-08-05 DIAGNOSIS — K31.A0 GASTRIC INTESTINAL METAPLASIA, UNSPECIFIED: ICD-10-CM

## 2025-08-05 DIAGNOSIS — R13.10 DYSPHAGIA, UNSPECIFIED: ICD-10-CM

## 2025-08-05 DIAGNOSIS — D50.9 IRON DEFICIENCY ANEMIA, UNSPECIFIED: ICD-10-CM

## 2025-08-05 PROCEDURE — 99214 OFFICE O/P EST MOD 30 MIN: CPT

## 2025-09-11 ENCOUNTER — APPOINTMENT (OUTPATIENT)
Age: 75
End: 2025-09-11

## 2025-09-12 ENCOUNTER — APPOINTMENT (OUTPATIENT)
Age: 75
End: 2025-09-12

## 2025-09-16 ENCOUNTER — TRANSCRIPTION ENCOUNTER (OUTPATIENT)
Age: 75
End: 2025-09-16